# Patient Record
Sex: MALE | Race: WHITE | NOT HISPANIC OR LATINO | Employment: OTHER | URBAN - METROPOLITAN AREA
[De-identification: names, ages, dates, MRNs, and addresses within clinical notes are randomized per-mention and may not be internally consistent; named-entity substitution may affect disease eponyms.]

---

## 2019-06-06 ENCOUNTER — HOSPITAL ENCOUNTER (OUTPATIENT)
Dept: RADIOLOGY | Facility: HOSPITAL | Age: 74
Discharge: HOME | End: 2019-06-06
Attending: ORTHOPAEDIC SURGERY
Payer: MEDICARE

## 2019-06-06 ENCOUNTER — OFFICE VISIT (OUTPATIENT)
Dept: ORTHOPEDICS | Facility: CLINIC | Age: 74
End: 2019-06-06
Payer: MEDICARE

## 2019-06-06 VITALS — BODY MASS INDEX: 33.67 KG/M2 | WEIGHT: 262.38 LBS | HEIGHT: 74 IN

## 2019-06-06 DIAGNOSIS — Z96.641 CHRONIC HIP PAIN AFTER TOTAL REPLACEMENT OF RIGHT HIP JOINT: Primary | ICD-10-CM

## 2019-06-06 DIAGNOSIS — Z96.641 CHRONIC HIP PAIN AFTER TOTAL REPLACEMENT OF RIGHT HIP JOINT: ICD-10-CM

## 2019-06-06 DIAGNOSIS — G89.29 CHRONIC HIP PAIN AFTER TOTAL REPLACEMENT OF RIGHT HIP JOINT: ICD-10-CM

## 2019-06-06 DIAGNOSIS — M25.551 CHRONIC HIP PAIN AFTER TOTAL REPLACEMENT OF RIGHT HIP JOINT: Primary | ICD-10-CM

## 2019-06-06 DIAGNOSIS — G89.29 CHRONIC HIP PAIN AFTER TOTAL REPLACEMENT OF RIGHT HIP JOINT: Primary | ICD-10-CM

## 2019-06-06 DIAGNOSIS — M25.551 CHRONIC HIP PAIN AFTER TOTAL REPLACEMENT OF RIGHT HIP JOINT: ICD-10-CM

## 2019-06-06 PROCEDURE — 73502 XR HIP 2 VIEW RIGHT: ICD-10-PCS | Mod: 26,RT,, | Performed by: RADIOLOGY

## 2019-06-06 PROCEDURE — 99999 PR PBB SHADOW E&M-EST. PATIENT-LVL III: ICD-10-PCS | Mod: PBBFAC,,, | Performed by: ORTHOPAEDIC SURGERY

## 2019-06-06 PROCEDURE — 99214 PR OFFICE/OUTPT VISIT, EST, LEVL IV, 30-39 MIN: ICD-10-PCS | Mod: 25,S$GLB,, | Performed by: ORTHOPAEDIC SURGERY

## 2019-06-06 PROCEDURE — 73502 X-RAY EXAM HIP UNI 2-3 VIEWS: CPT | Mod: 26,RT,, | Performed by: RADIOLOGY

## 2019-06-06 PROCEDURE — 73502 X-RAY EXAM HIP UNI 2-3 VIEWS: CPT | Mod: TC,RT

## 2019-06-06 PROCEDURE — 99999 PR PBB SHADOW E&M-EST. PATIENT-LVL III: CPT | Mod: PBBFAC,,, | Performed by: ORTHOPAEDIC SURGERY

## 2019-06-06 PROCEDURE — 99214 OFFICE O/P EST MOD 30 MIN: CPT | Mod: 25,S$GLB,, | Performed by: ORTHOPAEDIC SURGERY

## 2019-06-06 PROCEDURE — 20610 LARGE JOINT ASPIRATION/INJECTION: R GREATER TROCHANTERIC BURSA: ICD-10-PCS | Mod: RT,S$GLB,, | Performed by: ORTHOPAEDIC SURGERY

## 2019-06-06 PROCEDURE — 20610 DRAIN/INJ JOINT/BURSA W/O US: CPT | Mod: RT,S$GLB,, | Performed by: ORTHOPAEDIC SURGERY

## 2019-06-06 RX ORDER — TRIAMCINOLONE ACETONIDE 40 MG/ML
40 INJECTION, SUSPENSION INTRA-ARTICULAR; INTRAMUSCULAR
Status: DISCONTINUED | OUTPATIENT
Start: 2019-06-06 | End: 2019-06-06 | Stop reason: HOSPADM

## 2019-06-06 RX ADMIN — TRIAMCINOLONE ACETONIDE 40 MG: 40 INJECTION, SUSPENSION INTRA-ARTICULAR; INTRAMUSCULAR at 12:06

## 2019-06-06 NOTE — PROCEDURES
Large Joint Aspiration/Injection: R greater trochanteric bursa  Date/Time: 6/6/2019 12:06 PM  Performed by: John L. Ochsner Jr., MD  Authorized by: John L. [facility] Jr., MD       Location:  Hip  Site:  R greater trochanteric bursa  Needle size:  22 G  Approach:  Lateral  Medications:  40 mg triamcinolone acetonide 40 mg/mL

## 2019-06-06 NOTE — PROGRESS NOTES
HPI:    Patient is here today for a chief complaint of right hip pain.   Patient is status post a right total hip arthroplasty done 03/28/2016.  Patient had no complications with the total hip.  For 3 weeks he has been having a lot of start-up pain.  Sharp pain in the hip that goes away once he loosens up.  No groin pain.  Duration: 1 months  Intensity: severe  Character of pain: sharp  Location: He reports that the pain is positive  For lateral hip.    Past Medical History:   Diagnosis Date    Arthritis     Diabetes mellitus     Hypertension     Thyroid disease           Current Outpatient Medications:     ascorbic acid (VITAMIN C) 1000 MG tablet, Take 1,000 mg by mouth every morning. , Disp: , Rfl:     FEXOFENADINE HCL (ALLEGRA ORAL), Take by mouth every morning., Disp: , Rfl:     folic acid (FOLVITE) 800 MCG Tab, Take 800 mcg by mouth every morning. , Disp: , Rfl:     hydrochlorothiazide (HYDRODIURIL) 25 MG tablet, Take 25 mg by mouth every morning., Disp: , Rfl:     hydrocodone-acetaminophen 5-325mg (NORCO) 5-325 mg per tablet, Take 1 tablet by mouth every 6 to 8 hours as needed for Pain., Disp: 60 tablet, Rfl: 0    levothyroxine (SYNTHROID) 150 MCG tablet, Take 150 mcg by mouth before breakfast. , Disp: , Rfl:     lidocaine-prilocaine (EMLA) cream, Applies to cyst on left foot, Disp: , Rfl:     lisinopril 10 MG tablet, Take 10 mg by mouth every morning. , Disp: , Rfl:     metformin (GLUMETZA) 500 MG (MOD) 24 hr tablet, Take 500 mg by mouth 2 (two) times daily with meals., Disp: , Rfl:     ONE TOUCH ULTRA TEST Strp, , Disp: , Rfl: 3    pravastatin (PRAVACHOL) 10 MG tablet, Take 20 mg by mouth every morning. , Disp: , Rfl:     aspirin 325 MG tablet, Take 1 tablet (325 mg total) by mouth 2 (two) times daily., Disp: 56 tablet, Rfl: 0    pravastatin (PRAVACHOL) 20 MG tablet, 20 mg once daily. , Disp: , Rfl:      Review of patient's allergies indicates:  No Known Allergies     ROS  Constitutional:  "Negative for fever, Negative for weight loss  HENT Negative for congestion  Cardiovascular: Negative chest pain  Respiratory: Negative Shortness of breath  Heme: Negative excessive bleeding  Skin:NegativeItching, Negative breakdown  Musculoskeletal:Positive for back pain, Negative for joint pain, Positive muscle pain, Positive muscle weakness  Neurological: Negative for numbness and paresthesias   Psychiatric/Behavioral: Negative altered mental status, Negative for depression    Physical Exam:   Ht 6' 2" (1.88 m)   Wt 119 kg (262 lb 5.6 oz)   BMI 33.68 kg/m²   General appearance: This is a well-developed, Well nourished male No obvious acute distress.  Psychiatric: normal mood and affect;  pleasant and conversant; judgment and thought content normal  Gait is coordinated. Patient has antalgic gait to the right  Cardiovascular: There are no swelling or varicosities present.   Respiratory: normal respiratory effort   Lymphatic: no adenopathy   Neurologic: alert and oriented to person, place, and time   Deep Tendon Reflexes are normal;  Coordination and Balance: Normal   Musculoskeletal   Neck    ROM shows normal flexion and extension and lateral rotation    Palpation: Non-tender    Stability is normal    Strength is normal    Skin is normal without masses and lesions    Sensation is intact to light touch   Back    ROM showsabnormal flexion, extension    and rotation    Palpation shows no masses    Stability is normal        Core strength is diminished    Skin shows no rashes or cafe au lait spots;     Sensation is intact to light touch   Hip  Right hip has supple range of motion.  No groin pain or tenderness.  There is tenderness over the right greater trochanter..     Radiograph   Right total hip in excellent position no signs of loosening or failure.    Impression: Bursitis or possible lumbar radiculopathy.    Plan:  Today I will inject the bursa and see if that Moya things out for him.  Good news is the total hip " is in excellent position nice and solid and is having no problems with the total hip.

## 2021-01-10 ENCOUNTER — IMMUNIZATION (OUTPATIENT)
Dept: FAMILY MEDICINE | Facility: CLINIC | Age: 76
End: 2021-01-10
Payer: MEDICARE

## 2021-01-10 DIAGNOSIS — Z23 NEED FOR VACCINATION: ICD-10-CM

## 2021-01-10 PROCEDURE — 91300 COVID-19, MRNA, LNP-S, PF, 30 MCG/0.3 ML DOSE VACCINE: CPT | Mod: PBBFAC | Performed by: FAMILY MEDICINE

## 2021-01-19 ENCOUNTER — CLINICAL SUPPORT (OUTPATIENT)
Dept: URGENT CARE | Facility: CLINIC | Age: 76
End: 2021-01-19
Payer: MEDICARE

## 2021-01-19 DIAGNOSIS — Z03.818 ENCOUNTER FOR OBSERVATION FOR SUSPECTED EXPOSURE TO OTHER BIOLOGICAL AGENTS RULED OUT: Primary | ICD-10-CM

## 2021-01-19 LAB
CTP QC/QA: YES
SARS-COV-2 RDRP RESP QL NAA+PROBE: NEGATIVE

## 2021-01-19 PROCEDURE — U0002 COVID-19 LAB TEST NON-CDC: HCPCS | Mod: QW,S$GLB,, | Performed by: PHYSICIAN ASSISTANT

## 2021-01-19 PROCEDURE — U0002: ICD-10-PCS | Mod: QW,S$GLB,, | Performed by: PHYSICIAN ASSISTANT

## 2021-01-31 ENCOUNTER — IMMUNIZATION (OUTPATIENT)
Dept: FAMILY MEDICINE | Facility: CLINIC | Age: 76
End: 2021-01-31
Payer: MEDICARE

## 2021-01-31 DIAGNOSIS — Z23 NEED FOR VACCINATION: Primary | ICD-10-CM

## 2021-01-31 PROCEDURE — 91300 COVID-19, MRNA, LNP-S, PF, 30 MCG/0.3 ML DOSE VACCINE: CPT | Mod: PBBFAC | Performed by: FAMILY MEDICINE

## 2021-01-31 PROCEDURE — 0002A COVID-19, MRNA, LNP-S, PF, 30 MCG/0.3 ML DOSE VACCINE: CPT | Mod: PBBFAC | Performed by: FAMILY MEDICINE

## 2021-03-18 ENCOUNTER — PATIENT MESSAGE (OUTPATIENT)
Dept: RESEARCH | Facility: HOSPITAL | Age: 76
End: 2021-03-18

## 2021-03-26 ENCOUNTER — PATIENT MESSAGE (OUTPATIENT)
Dept: RESEARCH | Facility: HOSPITAL | Age: 76
End: 2021-03-26

## 2021-05-04 ENCOUNTER — HOSPITAL ENCOUNTER (OUTPATIENT)
Dept: RADIOLOGY | Facility: HOSPITAL | Age: 76
Discharge: HOME | End: 2021-05-04
Attending: NURSE PRACTITIONER
Payer: MEDICARE

## 2021-05-04 ENCOUNTER — OFFICE VISIT (OUTPATIENT)
Dept: ORTHOPEDICS | Facility: CLINIC | Age: 76
End: 2021-05-04
Payer: MEDICARE

## 2021-05-04 VITALS — WEIGHT: 262.38 LBS | HEIGHT: 74 IN | BODY MASS INDEX: 33.67 KG/M2

## 2021-05-04 DIAGNOSIS — M25.552 HIP PAIN, ACUTE, LEFT: ICD-10-CM

## 2021-05-04 DIAGNOSIS — M76.892 TENDONITIS OF LEFT HIP: Primary | ICD-10-CM

## 2021-05-04 DIAGNOSIS — M25.512 ACUTE PAIN OF LEFT SHOULDER: ICD-10-CM

## 2021-05-04 DIAGNOSIS — M25.552 HIP PAIN, ACUTE, LEFT: Primary | ICD-10-CM

## 2021-05-04 PROCEDURE — 1125F PR PAIN SEVERITY QUANTIFIED, PAIN PRESENT: ICD-10-PCS | Mod: S$GLB,,, | Performed by: NURSE PRACTITIONER

## 2021-05-04 PROCEDURE — 3008F PR BODY MASS INDEX (BMI) DOCUMENTED: ICD-10-PCS | Mod: CPTII,S$GLB,, | Performed by: NURSE PRACTITIONER

## 2021-05-04 PROCEDURE — 99214 OFFICE O/P EST MOD 30 MIN: CPT | Mod: S$GLB,,, | Performed by: NURSE PRACTITIONER

## 2021-05-04 PROCEDURE — 73502 X-RAY EXAM HIP UNI 2-3 VIEWS: CPT | Mod: 26,LT,, | Performed by: RADIOLOGY

## 2021-05-04 PROCEDURE — 73502 XR HIP 2 VIEW LEFT: ICD-10-PCS | Mod: 26,LT,, | Performed by: RADIOLOGY

## 2021-05-04 PROCEDURE — 1159F MED LIST DOCD IN RCRD: CPT | Mod: S$GLB,,, | Performed by: NURSE PRACTITIONER

## 2021-05-04 PROCEDURE — 99999 PR PBB SHADOW E&M-EST. PATIENT-LVL III: CPT | Mod: PBBFAC,,, | Performed by: NURSE PRACTITIONER

## 2021-05-04 PROCEDURE — 3288F PR FALLS RISK ASSESSMENT DOCUMENTED: ICD-10-PCS | Mod: CPTII,S$GLB,, | Performed by: NURSE PRACTITIONER

## 2021-05-04 PROCEDURE — 73502 X-RAY EXAM HIP UNI 2-3 VIEWS: CPT | Mod: TC,LT

## 2021-05-04 PROCEDURE — 1101F PR PT FALLS ASSESS DOC 0-1 FALLS W/OUT INJ PAST YR: ICD-10-PCS | Mod: CPTII,S$GLB,, | Performed by: NURSE PRACTITIONER

## 2021-05-04 PROCEDURE — 73030 X-RAY EXAM OF SHOULDER: CPT | Mod: TC,LT

## 2021-05-04 PROCEDURE — 3008F BODY MASS INDEX DOCD: CPT | Mod: CPTII,S$GLB,, | Performed by: NURSE PRACTITIONER

## 2021-05-04 PROCEDURE — 1159F PR MEDICATION LIST DOCUMENTED IN MEDICAL RECORD: ICD-10-PCS | Mod: S$GLB,,, | Performed by: NURSE PRACTITIONER

## 2021-05-04 PROCEDURE — 73030 X-RAY EXAM OF SHOULDER: CPT | Mod: 26,LT,, | Performed by: RADIOLOGY

## 2021-05-04 PROCEDURE — 1101F PT FALLS ASSESS-DOCD LE1/YR: CPT | Mod: CPTII,S$GLB,, | Performed by: NURSE PRACTITIONER

## 2021-05-04 PROCEDURE — 3288F FALL RISK ASSESSMENT DOCD: CPT | Mod: CPTII,S$GLB,, | Performed by: NURSE PRACTITIONER

## 2021-05-04 PROCEDURE — 1125F AMNT PAIN NOTED PAIN PRSNT: CPT | Mod: S$GLB,,, | Performed by: NURSE PRACTITIONER

## 2021-05-04 PROCEDURE — 73030 XR SHOULDER COMPLETE 2 OR MORE VIEWS LEFT: ICD-10-PCS | Mod: 26,LT,, | Performed by: RADIOLOGY

## 2021-05-04 PROCEDURE — 99999 PR PBB SHADOW E&M-EST. PATIENT-LVL III: ICD-10-PCS | Mod: PBBFAC,,, | Performed by: NURSE PRACTITIONER

## 2021-05-04 PROCEDURE — 99214 PR OFFICE/OUTPT VISIT, EST, LEVL IV, 30-39 MIN: ICD-10-PCS | Mod: S$GLB,,, | Performed by: NURSE PRACTITIONER

## 2021-05-04 RX ORDER — MELOXICAM 15 MG/1
15 TABLET ORAL DAILY
Qty: 30 TABLET | Refills: 0 | Status: SHIPPED | OUTPATIENT
Start: 2021-05-04 | End: 2021-06-10 | Stop reason: SDUPTHER

## 2021-05-09 ENCOUNTER — HOSPITAL ENCOUNTER (EMERGENCY)
Facility: HOSPITAL | Age: 76
Discharge: HOME | End: 2021-05-09
Attending: EMERGENCY MEDICINE
Payer: MEDICARE

## 2021-05-09 VITALS
SYSTOLIC BLOOD PRESSURE: 146 MMHG | TEMPERATURE: 99 F | OXYGEN SATURATION: 99 % | RESPIRATION RATE: 18 BRPM | BODY MASS INDEX: 32.08 KG/M2 | HEART RATE: 75 BPM | WEIGHT: 250 LBS | DIASTOLIC BLOOD PRESSURE: 72 MMHG | HEIGHT: 74 IN

## 2021-05-09 DIAGNOSIS — M25.452 HIP JOINT EFFUSION, LEFT: ICD-10-CM

## 2021-05-09 DIAGNOSIS — M25.559 HIP PAIN: ICD-10-CM

## 2021-05-09 DIAGNOSIS — T14.8XXA CONTUSION OF BONE: ICD-10-CM

## 2021-05-09 DIAGNOSIS — M25.552 LEFT HIP PAIN: Primary | ICD-10-CM

## 2021-05-09 LAB
ALBUMIN SERPL BCP-MCNC: 3.8 G/DL (ref 3.5–5.2)
ALP SERPL-CCNC: 58 U/L (ref 55–135)
ALT SERPL W/O P-5'-P-CCNC: 17 U/L (ref 10–44)
ANION GAP SERPL CALC-SCNC: 9 MMOL/L (ref 8–16)
AST SERPL-CCNC: 16 U/L (ref 10–40)
BASOPHILS # BLD AUTO: 0.03 K/UL (ref 0–0.2)
BASOPHILS NFR BLD: 0.4 % (ref 0–1.9)
BILIRUB SERPL-MCNC: 0.6 MG/DL (ref 0.1–1)
BUN SERPL-MCNC: 18 MG/DL (ref 8–23)
CALCIUM SERPL-MCNC: 9.5 MG/DL (ref 8.7–10.5)
CHLORIDE SERPL-SCNC: 105 MMOL/L (ref 95–110)
CK SERPL-CCNC: 139 U/L (ref 20–200)
CO2 SERPL-SCNC: 25 MMOL/L (ref 23–29)
CREAT SERPL-MCNC: 0.8 MG/DL (ref 0.5–1.4)
DIFFERENTIAL METHOD: ABNORMAL
EOSINOPHIL # BLD AUTO: 0.1 K/UL (ref 0–0.5)
EOSINOPHIL NFR BLD: 1.5 % (ref 0–8)
ERYTHROCYTE [DISTWIDTH] IN BLOOD BY AUTOMATED COUNT: 13.1 % (ref 11.5–14.5)
EST. GFR  (AFRICAN AMERICAN): >60 ML/MIN/1.73 M^2
EST. GFR  (NON AFRICAN AMERICAN): >60 ML/MIN/1.73 M^2
GLUCOSE SERPL-MCNC: 124 MG/DL (ref 70–110)
HCT VFR BLD AUTO: 39.6 % (ref 40–54)
HGB BLD-MCNC: 14.1 G/DL (ref 14–18)
IMM GRANULOCYTES # BLD AUTO: 0.02 K/UL (ref 0–0.04)
IMM GRANULOCYTES NFR BLD AUTO: 0.3 % (ref 0–0.5)
LYMPHOCYTES # BLD AUTO: 1.1 K/UL (ref 1–4.8)
LYMPHOCYTES NFR BLD: 13.1 % (ref 18–48)
MCH RBC QN AUTO: 34.3 PG (ref 27–31)
MCHC RBC AUTO-ENTMCNC: 35.6 G/DL (ref 32–36)
MCV RBC AUTO: 96 FL (ref 82–98)
MONOCYTES # BLD AUTO: 0.7 K/UL (ref 0.3–1)
MONOCYTES NFR BLD: 9.1 % (ref 4–15)
NEUTROPHILS # BLD AUTO: 6.1 K/UL (ref 1.8–7.7)
NEUTROPHILS NFR BLD: 75.6 % (ref 38–73)
NRBC BLD-RTO: 0 /100 WBC
PLATELET # BLD AUTO: 181 K/UL (ref 150–450)
PMV BLD AUTO: 11.4 FL (ref 9.2–12.9)
POTASSIUM SERPL-SCNC: 4.2 MMOL/L (ref 3.5–5.1)
PROT SERPL-MCNC: 7.2 G/DL (ref 6–8.4)
RBC # BLD AUTO: 4.11 M/UL (ref 4.6–6.2)
SODIUM SERPL-SCNC: 139 MMOL/L (ref 136–145)
WBC # BLD AUTO: 8 K/UL (ref 3.9–12.7)

## 2021-05-09 PROCEDURE — 99285 EMERGENCY DEPT VISIT HI MDM: CPT | Mod: 25

## 2021-05-09 PROCEDURE — 63600175 PHARM REV CODE 636 W HCPCS: Performed by: STUDENT IN AN ORGANIZED HEALTH CARE EDUCATION/TRAINING PROGRAM

## 2021-05-09 PROCEDURE — 85025 COMPLETE CBC W/AUTO DIFF WBC: CPT | Performed by: STUDENT IN AN ORGANIZED HEALTH CARE EDUCATION/TRAINING PROGRAM

## 2021-05-09 PROCEDURE — 25000003 PHARM REV CODE 250: Performed by: STUDENT IN AN ORGANIZED HEALTH CARE EDUCATION/TRAINING PROGRAM

## 2021-05-09 PROCEDURE — 80053 COMPREHEN METABOLIC PANEL: CPT | Performed by: STUDENT IN AN ORGANIZED HEALTH CARE EDUCATION/TRAINING PROGRAM

## 2021-05-09 PROCEDURE — 82550 ASSAY OF CK (CPK): CPT | Performed by: STUDENT IN AN ORGANIZED HEALTH CARE EDUCATION/TRAINING PROGRAM

## 2021-05-09 PROCEDURE — 99285 EMERGENCY DEPT VISIT HI MDM: CPT | Mod: GC,,, | Performed by: EMERGENCY MEDICINE

## 2021-05-09 PROCEDURE — 99285 PR EMERGENCY DEPT VISIT,LEVEL V: ICD-10-PCS | Mod: GC,,, | Performed by: EMERGENCY MEDICINE

## 2021-05-09 PROCEDURE — 63600175 PHARM REV CODE 636 W HCPCS: Performed by: EMERGENCY MEDICINE

## 2021-05-09 PROCEDURE — 96374 THER/PROPH/DIAG INJ IV PUSH: CPT

## 2021-05-09 PROCEDURE — 90715 TDAP VACCINE 7 YRS/> IM: CPT | Performed by: STUDENT IN AN ORGANIZED HEALTH CARE EDUCATION/TRAINING PROGRAM

## 2021-05-09 PROCEDURE — 96375 TX/PRO/DX INJ NEW DRUG ADDON: CPT

## 2021-05-09 PROCEDURE — 90471 IMMUNIZATION ADMIN: CPT | Performed by: STUDENT IN AN ORGANIZED HEALTH CARE EDUCATION/TRAINING PROGRAM

## 2021-05-09 RX ORDER — ONDANSETRON 2 MG/ML
4 INJECTION INTRAMUSCULAR; INTRAVENOUS ONCE
Status: COMPLETED | OUTPATIENT
Start: 2021-05-09 | End: 2021-05-09

## 2021-05-09 RX ORDER — MORPHINE SULFATE 2 MG/ML
6 INJECTION, SOLUTION INTRAMUSCULAR; INTRAVENOUS
Status: COMPLETED | OUTPATIENT
Start: 2021-05-09 | End: 2021-05-09

## 2021-05-09 RX ORDER — ONDANSETRON 2 MG/ML
4 INJECTION INTRAMUSCULAR; INTRAVENOUS ONCE
Status: DISCONTINUED | OUTPATIENT
Start: 2021-05-09 | End: 2021-05-09

## 2021-05-09 RX ORDER — METHOCARBAMOL 500 MG/1
1000 TABLET, FILM COATED ORAL
Status: COMPLETED | OUTPATIENT
Start: 2021-05-09 | End: 2021-05-09

## 2021-05-09 RX ORDER — KETOROLAC TROMETHAMINE 30 MG/ML
15 INJECTION, SOLUTION INTRAMUSCULAR; INTRAVENOUS
Status: COMPLETED | OUTPATIENT
Start: 2021-05-09 | End: 2021-05-09

## 2021-05-09 RX ORDER — METHOCARBAMOL 500 MG/1
1000 TABLET, FILM COATED ORAL 3 TIMES DAILY
Qty: 30 TABLET | Refills: 0 | Status: SHIPPED | OUTPATIENT
Start: 2021-05-09 | End: 2021-05-14

## 2021-05-09 RX ADMIN — METHOCARBAMOL 1000 MG: 500 TABLET ORAL at 10:05

## 2021-05-09 RX ADMIN — MORPHINE SULFATE 6 MG: 2 INJECTION, SOLUTION INTRAMUSCULAR; INTRAVENOUS at 11:05

## 2021-05-09 RX ADMIN — KETOROLAC TROMETHAMINE 15 MG: 30 INJECTION, SOLUTION INTRAMUSCULAR; INTRAVENOUS at 10:05

## 2021-05-09 RX ADMIN — ONDANSETRON 4 MG: 2 INJECTION INTRAMUSCULAR; INTRAVENOUS at 03:05

## 2021-05-09 RX ADMIN — CLOSTRIDIUM TETANI TOXOID ANTIGEN (FORMALDEHYDE INACTIVATED), CORYNEBACTERIUM DIPHTHERIAE TOXOID ANTIGEN (FORMALDEHYDE INACTIVATED), BORDETELLA PERTUSSIS TOXOID ANTIGEN (GLUTARALDEHYDE INACTIVATED), BORDETELLA PERTUSSIS FILAMENTOUS HEMAGGLUTININ ANTIGEN (FORMALDEHYDE INACTIVATED), BORDETELLA PERTUSSIS PERTACTIN ANTIGEN, AND BORDETELLA PERTUSSIS FIMBRIAE 2/3 ANTIGEN 0.5 ML: 5; 2; 2.5; 5; 3; 5 INJECTION, SUSPENSION INTRAMUSCULAR at 06:05

## 2021-06-10 ENCOUNTER — OFFICE VISIT (OUTPATIENT)
Dept: ORTHOPEDICS | Facility: CLINIC | Age: 76
End: 2021-06-10
Payer: MEDICARE

## 2021-06-10 VITALS — WEIGHT: 240.44 LBS | BODY MASS INDEX: 30.86 KG/M2 | HEIGHT: 74 IN

## 2021-06-10 DIAGNOSIS — M76.892 TENDONITIS OF LEFT HIP: ICD-10-CM

## 2021-06-10 DIAGNOSIS — M16.12 PRIMARY OSTEOARTHRITIS OF LEFT HIP: Primary | ICD-10-CM

## 2021-06-10 PROCEDURE — 1125F PR PAIN SEVERITY QUANTIFIED, PAIN PRESENT: ICD-10-PCS | Mod: S$GLB,,, | Performed by: ORTHOPAEDIC SURGERY

## 2021-06-10 PROCEDURE — 3288F PR FALLS RISK ASSESSMENT DOCUMENTED: ICD-10-PCS | Mod: CPTII,S$GLB,, | Performed by: ORTHOPAEDIC SURGERY

## 2021-06-10 PROCEDURE — 1159F PR MEDICATION LIST DOCUMENTED IN MEDICAL RECORD: ICD-10-PCS | Mod: S$GLB,,, | Performed by: ORTHOPAEDIC SURGERY

## 2021-06-10 PROCEDURE — 99999 PR PBB SHADOW E&M-EST. PATIENT-LVL III: ICD-10-PCS | Mod: PBBFAC,,, | Performed by: ORTHOPAEDIC SURGERY

## 2021-06-10 PROCEDURE — 99213 OFFICE O/P EST LOW 20 MIN: CPT | Mod: S$GLB,,, | Performed by: ORTHOPAEDIC SURGERY

## 2021-06-10 PROCEDURE — 99213 PR OFFICE/OUTPT VISIT, EST, LEVL III, 20-29 MIN: ICD-10-PCS | Mod: S$GLB,,, | Performed by: ORTHOPAEDIC SURGERY

## 2021-06-10 PROCEDURE — 3008F BODY MASS INDEX DOCD: CPT | Mod: CPTII,S$GLB,, | Performed by: ORTHOPAEDIC SURGERY

## 2021-06-10 PROCEDURE — 1101F PR PT FALLS ASSESS DOC 0-1 FALLS W/OUT INJ PAST YR: ICD-10-PCS | Mod: CPTII,S$GLB,, | Performed by: ORTHOPAEDIC SURGERY

## 2021-06-10 PROCEDURE — 1159F MED LIST DOCD IN RCRD: CPT | Mod: S$GLB,,, | Performed by: ORTHOPAEDIC SURGERY

## 2021-06-10 PROCEDURE — 1101F PT FALLS ASSESS-DOCD LE1/YR: CPT | Mod: CPTII,S$GLB,, | Performed by: ORTHOPAEDIC SURGERY

## 2021-06-10 PROCEDURE — 3288F FALL RISK ASSESSMENT DOCD: CPT | Mod: CPTII,S$GLB,, | Performed by: ORTHOPAEDIC SURGERY

## 2021-06-10 PROCEDURE — 99999 PR PBB SHADOW E&M-EST. PATIENT-LVL III: CPT | Mod: PBBFAC,,, | Performed by: ORTHOPAEDIC SURGERY

## 2021-06-10 PROCEDURE — 3008F PR BODY MASS INDEX (BMI) DOCUMENTED: ICD-10-PCS | Mod: CPTII,S$GLB,, | Performed by: ORTHOPAEDIC SURGERY

## 2021-06-10 PROCEDURE — 1125F AMNT PAIN NOTED PAIN PRSNT: CPT | Mod: S$GLB,,, | Performed by: ORTHOPAEDIC SURGERY

## 2021-06-10 RX ORDER — MELOXICAM 15 MG/1
15 TABLET ORAL DAILY
Qty: 30 TABLET | Refills: 0 | Status: SHIPPED | OUTPATIENT
Start: 2021-06-10 | End: 2021-07-06

## 2021-06-12 PROBLEM — M16.12 PRIMARY OSTEOARTHRITIS OF LEFT HIP: Status: ACTIVE | Noted: 2021-06-12

## 2021-06-17 ENCOUNTER — OFFICE VISIT (OUTPATIENT)
Dept: SPORTS MEDICINE | Facility: CLINIC | Age: 76
End: 2021-06-17
Payer: MEDICARE

## 2021-06-17 VITALS
WEIGHT: 240 LBS | HEART RATE: 78 BPM | SYSTOLIC BLOOD PRESSURE: 139 MMHG | HEIGHT: 74 IN | BODY MASS INDEX: 30.8 KG/M2 | DIASTOLIC BLOOD PRESSURE: 76 MMHG

## 2021-06-17 DIAGNOSIS — M25.552 CHRONIC LEFT HIP PAIN: ICD-10-CM

## 2021-06-17 DIAGNOSIS — M16.12 PRIMARY OSTEOARTHRITIS OF LEFT HIP: Primary | ICD-10-CM

## 2021-06-17 DIAGNOSIS — G89.29 CHRONIC LEFT HIP PAIN: ICD-10-CM

## 2021-06-17 PROCEDURE — 1101F PR PT FALLS ASSESS DOC 0-1 FALLS W/OUT INJ PAST YR: ICD-10-PCS | Mod: CPTII,S$GLB,, | Performed by: STUDENT IN AN ORGANIZED HEALTH CARE EDUCATION/TRAINING PROGRAM

## 2021-06-17 PROCEDURE — 20611 DRAIN/INJ JOINT/BURSA W/US: CPT | Mod: LT,S$GLB,, | Performed by: STUDENT IN AN ORGANIZED HEALTH CARE EDUCATION/TRAINING PROGRAM

## 2021-06-17 PROCEDURE — 20611 LARGE JOINT ASPIRATION/INJECTION: L HIP JOINT: ICD-10-PCS | Mod: LT,S$GLB,, | Performed by: STUDENT IN AN ORGANIZED HEALTH CARE EDUCATION/TRAINING PROGRAM

## 2021-06-17 PROCEDURE — 1126F AMNT PAIN NOTED NONE PRSNT: CPT | Mod: S$GLB,,, | Performed by: STUDENT IN AN ORGANIZED HEALTH CARE EDUCATION/TRAINING PROGRAM

## 2021-06-17 PROCEDURE — 1126F PR PAIN SEVERITY QUANTIFIED, NO PAIN PRESENT: ICD-10-PCS | Mod: S$GLB,,, | Performed by: STUDENT IN AN ORGANIZED HEALTH CARE EDUCATION/TRAINING PROGRAM

## 2021-06-17 PROCEDURE — 99999 PR PBB SHADOW E&M-EST. PATIENT-LVL IV: ICD-10-PCS | Mod: PBBFAC,,, | Performed by: STUDENT IN AN ORGANIZED HEALTH CARE EDUCATION/TRAINING PROGRAM

## 2021-06-17 PROCEDURE — 3288F PR FALLS RISK ASSESSMENT DOCUMENTED: ICD-10-PCS | Mod: CPTII,S$GLB,, | Performed by: STUDENT IN AN ORGANIZED HEALTH CARE EDUCATION/TRAINING PROGRAM

## 2021-06-17 PROCEDURE — 99499 NO LOS: ICD-10-PCS | Mod: S$GLB,,, | Performed by: STUDENT IN AN ORGANIZED HEALTH CARE EDUCATION/TRAINING PROGRAM

## 2021-06-17 PROCEDURE — 1101F PT FALLS ASSESS-DOCD LE1/YR: CPT | Mod: CPTII,S$GLB,, | Performed by: STUDENT IN AN ORGANIZED HEALTH CARE EDUCATION/TRAINING PROGRAM

## 2021-06-17 PROCEDURE — 99999 PR PBB SHADOW E&M-EST. PATIENT-LVL IV: CPT | Mod: PBBFAC,,, | Performed by: STUDENT IN AN ORGANIZED HEALTH CARE EDUCATION/TRAINING PROGRAM

## 2021-06-17 PROCEDURE — 99499 UNLISTED E&M SERVICE: CPT | Mod: S$GLB,,, | Performed by: STUDENT IN AN ORGANIZED HEALTH CARE EDUCATION/TRAINING PROGRAM

## 2021-06-17 PROCEDURE — 3288F FALL RISK ASSESSMENT DOCD: CPT | Mod: CPTII,S$GLB,, | Performed by: STUDENT IN AN ORGANIZED HEALTH CARE EDUCATION/TRAINING PROGRAM

## 2021-06-17 RX ORDER — TRIAMCINOLONE ACETONIDE 40 MG/ML
40 INJECTION, SUSPENSION INTRA-ARTICULAR; INTRAMUSCULAR
Status: DISCONTINUED | OUTPATIENT
Start: 2021-06-17 | End: 2021-06-17 | Stop reason: HOSPADM

## 2021-06-17 RX ADMIN — TRIAMCINOLONE ACETONIDE 40 MG: 40 INJECTION, SUSPENSION INTRA-ARTICULAR; INTRAMUSCULAR at 09:06

## 2021-09-30 ENCOUNTER — OFFICE VISIT (OUTPATIENT)
Dept: SPORTS MEDICINE | Facility: CLINIC | Age: 76
End: 2021-09-30
Payer: MEDICARE

## 2021-09-30 ENCOUNTER — HOSPITAL ENCOUNTER (OUTPATIENT)
Dept: RADIOLOGY | Facility: HOSPITAL | Age: 76
Discharge: HOME | End: 2021-09-30
Attending: ORTHOPAEDIC SURGERY
Payer: MEDICARE

## 2021-09-30 VITALS
HEART RATE: 62 BPM | DIASTOLIC BLOOD PRESSURE: 68 MMHG | BODY MASS INDEX: 30.8 KG/M2 | WEIGHT: 240 LBS | HEIGHT: 74 IN | SYSTOLIC BLOOD PRESSURE: 140 MMHG

## 2021-09-30 DIAGNOSIS — M19.011 ARTHRITIS OF RIGHT SHOULDER REGION: Primary | ICD-10-CM

## 2021-09-30 DIAGNOSIS — M25.511 RIGHT SHOULDER PAIN, UNSPECIFIED CHRONICITY: ICD-10-CM

## 2021-09-30 DIAGNOSIS — M25.511 CHRONIC RIGHT SHOULDER PAIN: ICD-10-CM

## 2021-09-30 DIAGNOSIS — G89.29 CHRONIC RIGHT SHOULDER PAIN: ICD-10-CM

## 2021-09-30 DIAGNOSIS — M75.21 BICEPS TENDINITIS OF RIGHT UPPER EXTREMITY: ICD-10-CM

## 2021-09-30 PROCEDURE — 73030 X-RAY EXAM OF SHOULDER: CPT | Mod: 26,RT,, | Performed by: RADIOLOGY

## 2021-09-30 PROCEDURE — 4010F ACE/ARB THERAPY RXD/TAKEN: CPT | Mod: CPTII,S$GLB,, | Performed by: ORTHOPAEDIC SURGERY

## 2021-09-30 PROCEDURE — 1101F PT FALLS ASSESS-DOCD LE1/YR: CPT | Mod: CPTII,S$GLB,, | Performed by: ORTHOPAEDIC SURGERY

## 2021-09-30 PROCEDURE — 1101F PR PT FALLS ASSESS DOC 0-1 FALLS W/OUT INJ PAST YR: ICD-10-PCS | Mod: CPTII,S$GLB,, | Performed by: ORTHOPAEDIC SURGERY

## 2021-09-30 PROCEDURE — 3077F PR MOST RECENT SYSTOLIC BLOOD PRESSURE >= 140 MM HG: ICD-10-PCS | Mod: CPTII,S$GLB,, | Performed by: ORTHOPAEDIC SURGERY

## 2021-09-30 PROCEDURE — 3288F PR FALLS RISK ASSESSMENT DOCUMENTED: ICD-10-PCS | Mod: CPTII,S$GLB,, | Performed by: ORTHOPAEDIC SURGERY

## 2021-09-30 PROCEDURE — 1125F AMNT PAIN NOTED PAIN PRSNT: CPT | Mod: CPTII,S$GLB,, | Performed by: ORTHOPAEDIC SURGERY

## 2021-09-30 PROCEDURE — 73030 XR SHOULDER COMPLETE 2 OR MORE VIEWS RIGHT: ICD-10-PCS | Mod: 26,RT,, | Performed by: RADIOLOGY

## 2021-09-30 PROCEDURE — 20610 LARGE JOINT ASPIRATION/INJECTION: R GLENOHUMERAL: ICD-10-PCS | Mod: RT,S$GLB,, | Performed by: ORTHOPAEDIC SURGERY

## 2021-09-30 PROCEDURE — 99999 PR PBB SHADOW E&M-EST. PATIENT-LVL II: ICD-10-PCS | Mod: PBBFAC,,, | Performed by: ORTHOPAEDIC SURGERY

## 2021-09-30 PROCEDURE — 99214 PR OFFICE/OUTPT VISIT, EST, LEVL IV, 30-39 MIN: ICD-10-PCS | Mod: 25,S$GLB,, | Performed by: ORTHOPAEDIC SURGERY

## 2021-09-30 PROCEDURE — 1125F PR PAIN SEVERITY QUANTIFIED, PAIN PRESENT: ICD-10-PCS | Mod: CPTII,S$GLB,, | Performed by: ORTHOPAEDIC SURGERY

## 2021-09-30 PROCEDURE — 3077F SYST BP >= 140 MM HG: CPT | Mod: CPTII,S$GLB,, | Performed by: ORTHOPAEDIC SURGERY

## 2021-09-30 PROCEDURE — 20610 DRAIN/INJ JOINT/BURSA W/O US: CPT | Mod: RT,S$GLB,, | Performed by: ORTHOPAEDIC SURGERY

## 2021-09-30 PROCEDURE — 99999 PR PBB SHADOW E&M-EST. PATIENT-LVL II: CPT | Mod: PBBFAC,,, | Performed by: ORTHOPAEDIC SURGERY

## 2021-09-30 PROCEDURE — 73030 X-RAY EXAM OF SHOULDER: CPT | Mod: TC,RT

## 2021-09-30 PROCEDURE — 4010F PR ACE/ARB THEARPY RXD/TAKEN: ICD-10-PCS | Mod: CPTII,S$GLB,, | Performed by: ORTHOPAEDIC SURGERY

## 2021-09-30 PROCEDURE — 3078F PR MOST RECENT DIASTOLIC BLOOD PRESSURE < 80 MM HG: ICD-10-PCS | Mod: CPTII,S$GLB,, | Performed by: ORTHOPAEDIC SURGERY

## 2021-09-30 PROCEDURE — 3288F FALL RISK ASSESSMENT DOCD: CPT | Mod: CPTII,S$GLB,, | Performed by: ORTHOPAEDIC SURGERY

## 2021-09-30 PROCEDURE — 99214 OFFICE O/P EST MOD 30 MIN: CPT | Mod: 25,S$GLB,, | Performed by: ORTHOPAEDIC SURGERY

## 2021-09-30 PROCEDURE — 3078F DIAST BP <80 MM HG: CPT | Mod: CPTII,S$GLB,, | Performed by: ORTHOPAEDIC SURGERY

## 2021-09-30 RX ADMIN — TRIAMCINOLONE ACETONIDE 40 MG: 40 INJECTION, SUSPENSION INTRA-ARTICULAR; INTRAMUSCULAR at 01:09

## 2021-10-05 ENCOUNTER — IMMUNIZATION (OUTPATIENT)
Dept: INTERNAL MEDICINE | Facility: CLINIC | Age: 76
End: 2021-10-05
Payer: MEDICARE

## 2021-10-05 DIAGNOSIS — Z23 NEED FOR VACCINATION: Primary | ICD-10-CM

## 2021-10-05 PROCEDURE — 0003A COVID-19, MRNA, LNP-S, PF, 30 MCG/0.3 ML DOSE VACCINE: CPT | Mod: CV19,PBBFAC | Performed by: INTERNAL MEDICINE

## 2021-10-05 PROCEDURE — 91300 COVID-19, MRNA, LNP-S, PF, 30 MCG/0.3 ML DOSE VACCINE: CPT | Mod: PBBFAC | Performed by: INTERNAL MEDICINE

## 2021-10-13 RX ORDER — TRIAMCINOLONE ACETONIDE 40 MG/ML
40 INJECTION, SUSPENSION INTRA-ARTICULAR; INTRAMUSCULAR
Status: DISCONTINUED | OUTPATIENT
Start: 2021-09-30 | End: 2021-10-13 | Stop reason: HOSPADM

## 2022-06-21 ENCOUNTER — IMMUNIZATION (OUTPATIENT)
Dept: INTERNAL MEDICINE | Facility: CLINIC | Age: 77
End: 2022-06-21
Payer: MEDICARE

## 2022-06-21 DIAGNOSIS — Z23 NEED FOR VACCINATION: Primary | ICD-10-CM

## 2022-06-21 PROCEDURE — 91305 COVID-19, MRNA, LNP-S, PF, 30 MCG/0.3 ML DOSE VACCINE (PFIZER): CPT | Mod: PBBFAC | Performed by: INTERNAL MEDICINE

## 2023-06-21 ENCOUNTER — HOSPITAL ENCOUNTER (OUTPATIENT)
Dept: PREADMISSION TESTING | Facility: OTHER | Age: 78
Discharge: HOME | End: 2023-06-21
Attending: ORTHOPAEDIC SURGERY
Payer: MEDICARE

## 2023-06-21 ENCOUNTER — ANESTHESIA EVENT (OUTPATIENT)
Dept: SURGERY | Facility: OTHER | Age: 78
End: 2023-06-21
Payer: MEDICARE

## 2023-06-21 VITALS
WEIGHT: 250 LBS | BODY MASS INDEX: 32.08 KG/M2 | OXYGEN SATURATION: 96 % | SYSTOLIC BLOOD PRESSURE: 160 MMHG | DIASTOLIC BLOOD PRESSURE: 90 MMHG | HEART RATE: 75 BPM | TEMPERATURE: 98 F | HEIGHT: 74 IN

## 2023-06-21 DIAGNOSIS — Z01.818 PREOP TESTING: Primary | ICD-10-CM

## 2023-06-21 PROCEDURE — 93010 ELECTROCARDIOGRAM REPORT: CPT | Mod: ,,, | Performed by: INTERNAL MEDICINE

## 2023-06-21 PROCEDURE — 93010 EKG 12-LEAD: ICD-10-PCS | Mod: ,,, | Performed by: INTERNAL MEDICINE

## 2023-06-21 PROCEDURE — 93005 ELECTROCARDIOGRAM TRACING: CPT

## 2023-06-21 RX ORDER — ACETAMINOPHEN 500 MG
1000 TABLET ORAL
Status: CANCELLED | OUTPATIENT
Start: 2023-06-21 | End: 2023-06-21

## 2023-06-21 RX ORDER — SODIUM CHLORIDE, SODIUM LACTATE, POTASSIUM CHLORIDE, CALCIUM CHLORIDE 600; 310; 30; 20 MG/100ML; MG/100ML; MG/100ML; MG/100ML
INJECTION, SOLUTION INTRAVENOUS CONTINUOUS
Status: CANCELLED | OUTPATIENT
Start: 2023-06-21

## 2023-06-21 RX ORDER — IBUPROFEN 200 MG
200 TABLET ORAL EVERY 6 HOURS PRN
Status: ON HOLD | COMMUNITY
End: 2023-07-11 | Stop reason: HOSPADM

## 2023-06-21 RX ORDER — ASPIRIN 81 MG/1
81 TABLET ORAL DAILY
Status: ON HOLD | COMMUNITY
End: 2023-07-11 | Stop reason: HOSPADM

## 2023-06-21 RX ORDER — MAGNESIUM HYDROXIDE 400 MG/5ML
SUSPENSION, ORAL (FINAL DOSE FORM) ORAL DAILY
COMMUNITY

## 2023-06-21 RX ORDER — PRAVASTATIN SODIUM 20 MG/1
20 TABLET ORAL DAILY
COMMUNITY

## 2023-06-21 RX ORDER — LISINOPRIL 40 MG/1
40 TABLET ORAL DAILY
COMMUNITY

## 2023-06-21 RX ORDER — LIDOCAINE HYDROCHLORIDE 10 MG/ML
0.5 INJECTION, SOLUTION EPIDURAL; INFILTRATION; INTRACAUDAL; PERINEURAL ONCE
Status: CANCELLED | OUTPATIENT
Start: 2023-06-21 | End: 2023-06-21

## 2023-06-21 NOTE — DISCHARGE INSTRUCTIONS
Information to Prepare you for your Surgery    PRE-ADMIT TESTING -  [phone]    [facility]          Your surgery has been scheduled at [facility]. We are pleased to have the opportunity to serve you. For Further Information please call [phone].    On the day of surgery please report to the Information Desk on the 1st floor.    CONTACT YOUR PHYSICIAN'S OFFICE THE DAY PRIOR TO YOUR SURGERY TO OBTAIN YOUR ARRIVAL TIME.     The evening before surgery do not eat anything after 9 p.m. ( this includes hard candy, chewing gum and mints).  You may only have GATORADE, POWERADE AND WATER  from 9 p.m. until you leave your home.   DO NOT DRINK ANY LIQUIDS ON THE WAY TO THE HOSPITAL.      Why does your anesthesiologist allow you to drink Gatorade/Powerade before surgery?  Gatorade/Powerade helps to increase your comfort before surgery and to decrease your nausea after surgery. The carbohydrates in Gatorade/Powerade help reduce your body's stress response to surgery.  If you are a diabetic-drink only water prior to surgery.       Patients may have 2 visitors pre and post procedure. Only 2 visitors will be allowed in the Surgical building with the patient. No one under the age of 12 will be allowed into the facility.    SPECIAL MEDICATION INSTRUCTIONS: TAKE medications checked off by the Anesthesiologist on your Medication List.    Angiogram Patients: Take medications as instructed by your physician, including aspirin.     Surgery Patients:    If you take ASPIRIN - Your PHYSICIAN/SURGEON will need to inform you IF/OR when you need to stop taking aspirin prior to your surgery.     The week prior to surgery do not ot take any medications containing IBUPROFEN or NSAIDS ( Advil, Motrin, Goodys, BC, Aleve, Naproxen etc) If you are not sure if you should take a medicine please call your surgeon's office.  Ok to take Tylenol    Do Not Wear any make-up  (especially eye make-up) to surgery. Please remove any false eyelashes or eyelash extensions. If you arrive the day of surgery with makeup/eyelashes on you will be required to remove prior to surgery. (There is a risk of corneal abrasions if eye makeup/eyelash extensions are not removed)      Leave all valuables at home.   Do Not wear any jewelry or watches, including any metal in body piercings. Jewelry must be removed prior to coming to the hospital.  There is a possibility that rings that are unable to be removed may be cut off if they are on the surgical extremity.    Please remove all hair extensions, wigs, clips and any other metal accessories/ ornaments from your hair.  These items may pose a flammable/fire risk in Surgery and must be removed.    Do not shave your surgical area at least 5 days prior to your surgery. The surgical prep will be performed at the hospital according to Infection Control regulations.    Contact Lens must be removed before surgery. Either do not wear the contact lens or bring a case and solution for storage.  Please bring a container for eyeglasses or dentures as required.  Bring any paperwork your physician has provided, such as consent forms,  history and physicals, doctor's orders, etc.   Bring comfortable clothes that are loose fitting to wear upon discharge. Take into consideration the type of surgery being performed.  Maintain your diet as advised per your physician the day prior to surgery.      Adequate rest the night before surgery is advised.   Park in the Parking lot behind the hospital or in the [place] [facility] across the street from the parking lot. Parking is complimentary.  If you will be discharged the same day as your procedure, please arrange for a responsible adult to drive you home or to accompany you if traveling by taxi.   YOU WILL NOT BE PERMITTED TO DRIVE OR TO LEAVE THE HOSPITAL ALONE AFTER SURGERY.   If you are being discharged the same day, it is  strongly recommended that you arrange for someone to remain with you for the first 24 hrs following your surgery.    The Surgeon will speak to your family/visitor after your surgery regarding the outcome of your surgery and post op care.  The Surgeon may speak to you after your surgery, but there is a possibility you may not remember the details.  Please check with your family members regarding the conversation with the Surgeon.    We strongly recommend whoever is bringing you home be present for discharge instructions.  This will ensure a thorough understanding for your post op home care.    ALL CHILDREN MUST ALWAYS BE ACCOMPANIED BY AN ADULT.    Visitors-Refer to current Visitor policy handouts.    Thank you for your cooperation.  The Staff of [facility].            Bathing Instructions with Hibiclens    Shower the evening before and morning of your procedure with Chlorhexidine (Hibiclens)  do not use Chlorhexidine on your face or genitals. Do not get in your eyes.  Wash your face with water and your regular face wash/soap  Use your regular shampoo  Apply Chlorhexidine (Hibiclens) directly on your skin or on a wet washcloth and wash gently. When showering: Move away from the shower stream when applying Chlorhexidine (Hibiclens) to avoid rinsing off too soon.  Rinse thoroughly with warm water  Do not dilute Chlorhexidine (Hibiclens)   Dry off as usual, do not use any deodorant, powder, body lotions, perfume, after shave or cologne.

## 2023-06-21 NOTE — ANESTHESIA PREPROCEDURE EVALUATION
06/21/2023  Maksim Villeda is a 78 y.o., male.      Pre-op Assessment    I have reviewed the Patient Summary Reports.     I have reviewed the Nursing Notes. I have reviewed the NPO Status.   I have reviewed the Medications.     Review of Systems  Anesthesia Hx:  Denies Family Hx of Anesthesia complications.   Denies Personal Hx of Anesthesia complications.   Social:  Former Smoker    Hematology/Oncology:  Hematology Normal   Oncology Normal     Cardiovascular:   Hypertension hyperlipidemia Stress 1/2022:  FINDINGS: Calculated left ventricular ejection fraction is 59% at rest and 60% during stress. No reversible perfusion defects are identified to suggest ischemia. No abnormal wall motion is identified. Left ventricular heart chamber size is within normal limits. TID ratio is .    IMPRESSION:    No reversible perfusion defects to suggest ischemia.   Pulmonary:  Pulmonary Normal    Renal/:  Renal/ Normal     Hepatic/GI:   Hepatitis, C    Musculoskeletal:   Arthritis     Neurological:   H/o dizziness w/u with Brain imaging. negative   Endocrine:   Diabetes Hypothyroidism        Physical Exam  General: Well nourished, Alert, Cooperative and Oriented    Airway:  Mallampati: II   Mouth Opening: Normal  TM Distance: Normal  Tongue: Normal  Neck ROM: Normal ROM    Dental:  Intact, Caps / Implants        Anesthesia Plan  Type of Anesthesia, risks & benefits discussed:    Anesthesia Type: Spinal  Intra-op Monitoring Plan: Standard ASA Monitors  Post Op Pain Control Plan: multimodal analgesia  Informed Consent: Informed consent signed with the Patient and all parties understand the risks and agree with anesthesia plan.  All questions answered.   ASA Score: 3  Anesthesia Plan Notes: Outside Labs normal. EKG    Cardiac clearance low to mod risk based on age and comorbids    Ready For Surgery From Anesthesia  Perspective.     .

## 2023-06-26 NOTE — DISCHARGE INSTRUCTIONS
Hip Replacement Discharge Instructions    IF YOU HAVE ANY CONCERNS AFTER YOUR SURGERY PLEASE CALL YOUR SURGEON   [phone]          Pain:  After surgery you may feel some pain in the operative leg groin area. This is normal. Your hip will likely have been injected with a numbing medicine  prior to completion of surgery for pain control.  You will also get a prescription for pain control before you leave the hospital. Administer your pain med with food to decrease side effects of nausea. Pain meds can cause constipation. Taking a stool softner is recommended.   Be aware of your ingestion of Tylenol if your pain med has this in it,you should not exceed 3000mg of acetaminophen as this can damage your liver.  Elevate your leg when sitting for comfort  Incision Care:  Some drainage from the incision in the first 72 hours is normal. If drainage is excessive, remove bandage,  pat dry, cover with sterile gauze, and secure with tape. Notify M.D. for excessive drainage.  Staples will be removed 14 days after surgery. If your incision is closed with steri strips, do not remove.  Activity:  See attached hip precautions and follow for 6 weeks (instructions found at the end of packet):  Perform exercises 3 times a day.  Use a hard, flat surface, such as a firm mattress, when exercising.  You may shower 2 days after surgery providing the dressing is waterproof.Support/help is mandatory during showering. No bathing or swimming for 6 weeks or until incision is completely healed.  Wear dominik hose for 3 weeks after surgery. You may remove for 1-2 hours during the day only.Send patient home with an extra pair dominik hose. Dominik hose stockings can be washed on gentle cycle, but do not place in dryer.  Safety:  Add cushions to low chairs and car seats for elevation.  When getting in and out of a car, it is important to keep your leg straight and out to the side. Wear a seatbelt at all times.  You will be given a raised toilet seat (3-1  commode).  Use a walker, cane, or crutches as long as M.D. recommends.  Possible Complications: Report to Surgeon  Infection  Unexpected redness  Persistent drainage  iii. Temperature ,can be treated with tylenol. Do not go to the emergency room or urgent care for a temperature, call your surgeon.   Additional swelling  Pain not controlled with current pain medicine  Blood clot : You will be placed on a blood thinner to reduce the risk of blood clots  Unusual pain  Red or discolored skin  Swelling  Unusual warm skin  Dislocation  Severe hip pain especially when moved  The injured leg is shorter than the uninjured leg  The injured leg lies in an abnormal position. In most cases the leg is bent at the hip, turned inward and pulled toward the middle of the body.

## 2023-07-09 NOTE — PLAN OF CARE
Assessment completed with patient via phone - left hip arthroplasty planned tomorrow - will discharge home next day - will allow placement with Dr Dumont's preferred provider - all necessary DME available at home - son/daughter will provide transportation home & assist needed during recovery     [facility] ([facility])  Initial Discharge Assessment       Primary Care Provider: Tomas Rendon Sr, MD    Admission Diagnosis: Unilateral primary osteoarthritis, left hip [M16.12]    Admission Date: 7/10/23  Expected Discharge Date: 7/11/23    Transition of Care Barriers: None    Payor: HU[employer] MANAGED MEDICARE / Plan: HU[employer]NA MEDICARE HMO / Product Type: Capitation /     Extended Emergency Contact Information  Primary Emergency Contact: YonnyantoninoTamiko  Mobile Phone: [phone]  Relation: Daughter    Discharge Plan A: [facility]         [facility]  [address]  [facility] [address]  Phone: [phone] Fax: [phone]    [id number] - [place], [place] - [address]  [address]  [place] [address]  Phone: [phone] Fax: [phone]      Initial Assessment (most recent)       Adult Discharge Assessment - 07/09/23 1403          Discharge Assessment    Assessment Type Discharge Planning Assessment     Confirmed/corrected address, phone number and insurance Yes     Confirmed Demographics Correct on Facesheet     Source of Information patient     Communicated LIZ with patient/caregiver Yes     Reason For Admission left hip arthroplasty     People in Home alone     Do you expect to return to your current living situation? Yes     Do you have help at home or someone to help you manage your care at home? Yes     Prior to hospitilization cognitive status: Alert/Oriented     Current cognitive status: Alert/Oriented     Equipment Currently Used at Home --   available but not currently using: rolling walker, rollator, straight cane, bedside commode, raised toilet, bath  bench, hip kit, glucometer    Do you currently have service(s) that help you manage your care at home? No     Do you take prescription medications? Yes     Do you have prescription coverage? Yes     Do you have any problems affording any of your prescribed medications? No     Is the patient taking medications as prescribed? yes     Who is going to help you get home at discharge? daughter or son     How do you get to doctors appointments? car, drives self     Are you on dialysis? No     Discharge Plan A Home Health     DME Needed Upon Discharge  none     Discharge Plan discussed with: Patient     Transition of Care Barriers None

## 2023-07-10 ENCOUNTER — HOSPITAL ENCOUNTER (OUTPATIENT)
Facility: OTHER | Age: 78
Discharge: HOME HEALTH | End: 2023-07-11
Attending: ORTHOPAEDIC SURGERY | Admitting: ORTHOPAEDIC SURGERY
Payer: MEDICARE

## 2023-07-10 ENCOUNTER — ANESTHESIA (OUTPATIENT)
Dept: SURGERY | Facility: OTHER | Age: 78
End: 2023-07-10
Payer: MEDICARE

## 2023-07-10 DIAGNOSIS — Z01.818 PREOP TESTING: ICD-10-CM

## 2023-07-10 DIAGNOSIS — M16.12 PRIMARY OSTEOARTHRITIS OF LEFT HIP: Primary | ICD-10-CM

## 2023-07-10 LAB
ABO + RH BLD: NORMAL
ANION GAP SERPL CALC-SCNC: 11 MMOL/L (ref 8–16)
BLD GP AB SCN CELLS X3 SERPL QL: NORMAL
BUN SERPL-MCNC: 21 MG/DL (ref 8–23)
CALCIUM SERPL-MCNC: 8.6 MG/DL (ref 8.7–10.5)
CHLORIDE SERPL-SCNC: 103 MMOL/L (ref 95–110)
CO2 SERPL-SCNC: 21 MMOL/L (ref 23–29)
CREAT SERPL-MCNC: 0.8 MG/DL (ref 0.5–1.4)
EST. GFR  (NO RACE VARIABLE): >60 ML/MIN/1.73 M^2
GLUCOSE SERPL-MCNC: 209 MG/DL (ref 70–110)
HCT VFR BLD AUTO: 33.4 % (ref 40–54)
HGB BLD-MCNC: 11.8 G/DL (ref 14–18)
POCT GLUCOSE: 115 MG/DL (ref 70–110)
POCT GLUCOSE: 172 MG/DL (ref 70–110)
POCT GLUCOSE: 280 MG/DL (ref 70–110)
POTASSIUM SERPL-SCNC: 3.9 MMOL/L (ref 3.5–5.1)
SODIUM SERPL-SCNC: 135 MMOL/L (ref 136–145)
SPECIMEN OUTDATE: NORMAL
TROPONIN I SERPL DL<=0.01 NG/ML-MCNC: <0.006 NG/ML (ref 0–0.03)

## 2023-07-10 PROCEDURE — 85014 HEMATOCRIT: CPT | Performed by: INTERNAL MEDICINE

## 2023-07-10 PROCEDURE — 36000710: Performed by: ORTHOPAEDIC SURGERY

## 2023-07-10 PROCEDURE — C1776 JOINT DEVICE (IMPLANTABLE): HCPCS | Performed by: ORTHOPAEDIC SURGERY

## 2023-07-10 PROCEDURE — 37000009 HC ANESTHESIA EA ADD 15 MINS: Performed by: ORTHOPAEDIC SURGERY

## 2023-07-10 PROCEDURE — 25000003 PHARM REV CODE 250: Performed by: NURSE PRACTITIONER

## 2023-07-10 PROCEDURE — 86900 BLOOD TYPING SEROLOGIC ABO: CPT | Performed by: ORTHOPAEDIC SURGERY

## 2023-07-10 PROCEDURE — 37000008 HC ANESTHESIA 1ST 15 MINUTES: Performed by: ORTHOPAEDIC SURGERY

## 2023-07-10 PROCEDURE — 63600175 PHARM REV CODE 636 W HCPCS: Performed by: ANESTHESIOLOGY

## 2023-07-10 PROCEDURE — C1729 CATH, DRAINAGE: HCPCS | Performed by: ORTHOPAEDIC SURGERY

## 2023-07-10 PROCEDURE — 63600175 PHARM REV CODE 636 W HCPCS: Performed by: NURSE PRACTITIONER

## 2023-07-10 PROCEDURE — 97530 THERAPEUTIC ACTIVITIES: CPT

## 2023-07-10 PROCEDURE — 63600175 PHARM REV CODE 636 W HCPCS: Performed by: STUDENT IN AN ORGANIZED HEALTH CARE EDUCATION/TRAINING PROGRAM

## 2023-07-10 PROCEDURE — 36000711: Performed by: ORTHOPAEDIC SURGERY

## 2023-07-10 PROCEDURE — 63600175 PHARM REV CODE 636 W HCPCS: Performed by: ORTHOPAEDIC SURGERY

## 2023-07-10 PROCEDURE — 25000003 PHARM REV CODE 250: Performed by: ANESTHESIOLOGY

## 2023-07-10 PROCEDURE — 25000003 PHARM REV CODE 250: Performed by: ORTHOPAEDIC SURGERY

## 2023-07-10 PROCEDURE — D9220A PRA ANESTHESIA: ICD-10-PCS | Mod: ANES,,, | Performed by: ANESTHESIOLOGY

## 2023-07-10 PROCEDURE — C9290 INJ, BUPIVACAINE LIPOSOME: HCPCS | Performed by: ORTHOPAEDIC SURGERY

## 2023-07-10 PROCEDURE — 85018 HEMOGLOBIN: CPT | Performed by: INTERNAL MEDICINE

## 2023-07-10 PROCEDURE — 82962 GLUCOSE BLOOD TEST: CPT | Performed by: ORTHOPAEDIC SURGERY

## 2023-07-10 PROCEDURE — D9220A PRA ANESTHESIA: ICD-10-PCS | Mod: CRNA,,, | Performed by: NURSE ANESTHETIST, CERTIFIED REGISTERED

## 2023-07-10 PROCEDURE — 99900035 HC TECH TIME PER 15 MIN (STAT)

## 2023-07-10 PROCEDURE — D9220A PRA ANESTHESIA: Mod: ANES,,, | Performed by: ANESTHESIOLOGY

## 2023-07-10 PROCEDURE — C1713 ANCHOR/SCREW BN/BN,TIS/BN: HCPCS | Performed by: ORTHOPAEDIC SURGERY

## 2023-07-10 PROCEDURE — 80048 BASIC METABOLIC PNL TOTAL CA: CPT | Performed by: INTERNAL MEDICINE

## 2023-07-10 PROCEDURE — 94761 N-INVAS EAR/PLS OXIMETRY MLT: CPT

## 2023-07-10 PROCEDURE — 36415 COLL VENOUS BLD VENIPUNCTURE: CPT | Performed by: ORTHOPAEDIC SURGERY

## 2023-07-10 PROCEDURE — 63600175 PHARM REV CODE 636 W HCPCS: Performed by: NURSE ANESTHETIST, CERTIFIED REGISTERED

## 2023-07-10 PROCEDURE — 94799 UNLISTED PULMONARY SVC/PX: CPT

## 2023-07-10 PROCEDURE — 25000003 PHARM REV CODE 250: Performed by: STUDENT IN AN ORGANIZED HEALTH CARE EDUCATION/TRAINING PROGRAM

## 2023-07-10 PROCEDURE — 97162 PT EVAL MOD COMPLEX 30 MIN: CPT

## 2023-07-10 PROCEDURE — 36415 COLL VENOUS BLD VENIPUNCTURE: CPT | Performed by: INTERNAL MEDICINE

## 2023-07-10 PROCEDURE — 71000039 HC RECOVERY, EACH ADD'L HOUR: Performed by: ORTHOPAEDIC SURGERY

## 2023-07-10 PROCEDURE — 25000003 PHARM REV CODE 250: Performed by: NURSE ANESTHETIST, CERTIFIED REGISTERED

## 2023-07-10 PROCEDURE — 27201423 OPTIME MED/SURG SUP & DEVICES STERILE SUPPLY: Performed by: ORTHOPAEDIC SURGERY

## 2023-07-10 PROCEDURE — 84484 ASSAY OF TROPONIN QUANT: CPT | Performed by: INTERNAL MEDICINE

## 2023-07-10 PROCEDURE — D9220A PRA ANESTHESIA: Mod: CRNA,,, | Performed by: NURSE ANESTHETIST, CERTIFIED REGISTERED

## 2023-07-10 PROCEDURE — 71000033 HC RECOVERY, INTIAL HOUR: Performed by: ORTHOPAEDIC SURGERY

## 2023-07-10 DEVICE — SCREW BONE SELF TAP 6.5X40: Type: IMPLANTABLE DEVICE | Site: HIP | Status: FUNCTIONAL

## 2023-07-10 DEVICE — IMPLANTABLE DEVICE: Type: IMPLANTABLE DEVICE | Site: HIP | Status: FUNCTIONAL

## 2023-07-10 DEVICE — SCREW BONE 6.5X30 SELF-TAP: Type: IMPLANTABLE DEVICE | Site: HIP | Status: FUNCTIONAL

## 2023-07-10 DEVICE — STEM FEMORAL 12/14 14X149MM: Type: IMPLANTABLE DEVICE | Site: HIP | Status: FUNCTIONAL

## 2023-07-10 RX ORDER — ACETAMINOPHEN 500 MG
1000 TABLET ORAL EVERY 8 HOURS
Status: DISCONTINUED | OUTPATIENT
Start: 2023-07-10 | End: 2023-07-11 | Stop reason: HOSPADM

## 2023-07-10 RX ORDER — LIDOCAINE HYDROCHLORIDE 20 MG/ML
INJECTION INTRAVENOUS
Status: DISCONTINUED | OUTPATIENT
Start: 2023-07-10 | End: 2023-07-10

## 2023-07-10 RX ORDER — SODIUM CHLORIDE, SODIUM LACTATE, POTASSIUM CHLORIDE, CALCIUM CHLORIDE 600; 310; 30; 20 MG/100ML; MG/100ML; MG/100ML; MG/100ML
INJECTION, SOLUTION INTRAVENOUS CONTINUOUS
Status: DISCONTINUED | OUTPATIENT
Start: 2023-07-10 | End: 2023-07-11

## 2023-07-10 RX ORDER — LEVOTHYROXINE SODIUM 75 UG/1
150 TABLET ORAL
Status: DISCONTINUED | OUTPATIENT
Start: 2023-07-11 | End: 2023-07-11 | Stop reason: HOSPADM

## 2023-07-10 RX ORDER — BISACODYL 10 MG
10 SUPPOSITORY, RECTAL RECTAL DAILY PRN
Status: DISCONTINUED | OUTPATIENT
Start: 2023-07-10 | End: 2023-07-11 | Stop reason: HOSPADM

## 2023-07-10 RX ORDER — PRAVASTATIN SODIUM 20 MG/1
20 TABLET ORAL DAILY
Status: DISCONTINUED | OUTPATIENT
Start: 2023-07-11 | End: 2023-07-11 | Stop reason: HOSPADM

## 2023-07-10 RX ORDER — OXYCODONE HYDROCHLORIDE 5 MG/1
5 TABLET ORAL EVERY 4 HOURS PRN
Status: DISCONTINUED | OUTPATIENT
Start: 2023-07-10 | End: 2023-07-11 | Stop reason: HOSPADM

## 2023-07-10 RX ORDER — ACETAMINOPHEN 500 MG
1000 TABLET ORAL
Status: COMPLETED | OUTPATIENT
Start: 2023-07-10 | End: 2023-07-10

## 2023-07-10 RX ORDER — CETIRIZINE HYDROCHLORIDE 5 MG/1
5 TABLET ORAL DAILY
Status: DISCONTINUED | OUTPATIENT
Start: 2023-07-11 | End: 2023-07-11 | Stop reason: HOSPADM

## 2023-07-10 RX ORDER — EPHEDRINE SULFATE 50 MG/ML
INJECTION, SOLUTION INTRAVENOUS
Status: DISCONTINUED | OUTPATIENT
Start: 2023-07-10 | End: 2023-07-10

## 2023-07-10 RX ORDER — POLYETHYLENE GLYCOL 3350 17 G/17G
17 POWDER, FOR SOLUTION ORAL DAILY
Status: DISCONTINUED | OUTPATIENT
Start: 2023-07-11 | End: 2023-07-11 | Stop reason: HOSPADM

## 2023-07-10 RX ORDER — GLUCAGON 1 MG
1 KIT INJECTION
Status: DISCONTINUED | OUTPATIENT
Start: 2023-07-10 | End: 2023-07-11 | Stop reason: HOSPADM

## 2023-07-10 RX ORDER — ACETAMINOPHEN 500 MG
1000 TABLET ORAL
Status: DISCONTINUED | OUTPATIENT
Start: 2023-07-10 | End: 2023-07-10

## 2023-07-10 RX ORDER — OXYCODONE HYDROCHLORIDE 5 MG/1
10 TABLET ORAL EVERY 4 HOURS PRN
Status: DISCONTINUED | OUTPATIENT
Start: 2023-07-10 | End: 2023-07-11 | Stop reason: HOSPADM

## 2023-07-10 RX ORDER — DIPHENHYDRAMINE HYDROCHLORIDE 50 MG/ML
25 INJECTION INTRAMUSCULAR; INTRAVENOUS EVERY 6 HOURS PRN
Status: DISCONTINUED | OUTPATIENT
Start: 2023-07-10 | End: 2023-07-11 | Stop reason: HOSPADM

## 2023-07-10 RX ORDER — ROPIVACAINE HYDROCHLORIDE 5 MG/ML
INJECTION, SOLUTION EPIDURAL; INFILTRATION; PERINEURAL
Status: COMPLETED | OUTPATIENT
Start: 2023-07-10 | End: 2023-07-10

## 2023-07-10 RX ORDER — ONDANSETRON 2 MG/ML
8 INJECTION INTRAMUSCULAR; INTRAVENOUS EVERY 8 HOURS PRN
Status: DISCONTINUED | OUTPATIENT
Start: 2023-07-10 | End: 2023-07-11 | Stop reason: HOSPADM

## 2023-07-10 RX ORDER — TRANEXAMIC ACID 100 MG/ML
INJECTION, SOLUTION INTRAVENOUS
Status: DISCONTINUED | OUTPATIENT
Start: 2023-07-10 | End: 2023-07-10

## 2023-07-10 RX ORDER — HYDROMORPHONE HYDROCHLORIDE 2 MG/ML
0.4 INJECTION, SOLUTION INTRAMUSCULAR; INTRAVENOUS; SUBCUTANEOUS EVERY 5 MIN PRN
Status: DISCONTINUED | OUTPATIENT
Start: 2023-07-10 | End: 2023-07-10 | Stop reason: HOSPADM

## 2023-07-10 RX ORDER — VITAMIN B COMPLEX
1 CAPSULE ORAL DAILY
COMMUNITY

## 2023-07-10 RX ORDER — SODIUM CHLORIDE 0.9 % (FLUSH) 0.9 %
2 SYRINGE (ML) INJECTION EVERY 6 HOURS PRN
Status: DISCONTINUED | OUTPATIENT
Start: 2023-07-10 | End: 2023-07-11 | Stop reason: HOSPADM

## 2023-07-10 RX ORDER — INSULIN ASPART 100 [IU]/ML
0-5 INJECTION, SOLUTION INTRAVENOUS; SUBCUTANEOUS
Status: DISCONTINUED | OUTPATIENT
Start: 2023-07-10 | End: 2023-07-11 | Stop reason: HOSPADM

## 2023-07-10 RX ORDER — HYDROMORPHONE HYDROCHLORIDE 1 MG/ML
0.5 INJECTION, SOLUTION INTRAMUSCULAR; INTRAVENOUS; SUBCUTANEOUS EVERY 4 HOURS PRN
Status: DISCONTINUED | OUTPATIENT
Start: 2023-07-10 | End: 2023-07-11 | Stop reason: HOSPADM

## 2023-07-10 RX ORDER — PROCHLORPERAZINE EDISYLATE 5 MG/ML
5 INJECTION INTRAMUSCULAR; INTRAVENOUS EVERY 30 MIN PRN
Status: DISCONTINUED | OUTPATIENT
Start: 2023-07-10 | End: 2023-07-10 | Stop reason: HOSPADM

## 2023-07-10 RX ORDER — CHOLECALCIFEROL (VITAMIN D3) 25 MCG
1000 TABLET ORAL DAILY
COMMUNITY

## 2023-07-10 RX ORDER — MUPIROCIN 20 MG/G
OINTMENT TOPICAL 2 TIMES DAILY
Status: DISCONTINUED | OUTPATIENT
Start: 2023-07-10 | End: 2023-07-11 | Stop reason: HOSPADM

## 2023-07-10 RX ORDER — CELECOXIB 200 MG/1
200 CAPSULE ORAL 2 TIMES DAILY
Status: DISCONTINUED | OUTPATIENT
Start: 2023-07-10 | End: 2023-07-11 | Stop reason: HOSPADM

## 2023-07-10 RX ORDER — DOCUSATE SODIUM 100 MG/1
100 CAPSULE, LIQUID FILLED ORAL EVERY 12 HOURS
Status: DISCONTINUED | OUTPATIENT
Start: 2023-07-10 | End: 2023-07-11 | Stop reason: HOSPADM

## 2023-07-10 RX ORDER — METFORMIN HYDROCHLORIDE 500 MG/1
500 TABLET, EXTENDED RELEASE ORAL
Status: DISCONTINUED | OUTPATIENT
Start: 2023-07-10 | End: 2023-07-11 | Stop reason: HOSPADM

## 2023-07-10 RX ORDER — LIDOCAINE HYDROCHLORIDE 10 MG/ML
0.5 INJECTION, SOLUTION EPIDURAL; INFILTRATION; INTRACAUDAL; PERINEURAL ONCE
Status: DISCONTINUED | OUTPATIENT
Start: 2023-07-10 | End: 2023-07-10 | Stop reason: HOSPADM

## 2023-07-10 RX ORDER — DEXTROSE MONOHYDRATE AND SODIUM CHLORIDE 5; .9 G/100ML; G/100ML
INJECTION, SOLUTION INTRAVENOUS CONTINUOUS
Status: DISCONTINUED | OUTPATIENT
Start: 2023-07-10 | End: 2023-07-11

## 2023-07-10 RX ORDER — IBUPROFEN 200 MG
16 TABLET ORAL
Status: DISCONTINUED | OUTPATIENT
Start: 2023-07-10 | End: 2023-07-11 | Stop reason: HOSPADM

## 2023-07-10 RX ORDER — LISINOPRIL 20 MG/1
40 TABLET ORAL DAILY
Status: DISCONTINUED | OUTPATIENT
Start: 2023-07-11 | End: 2023-07-11 | Stop reason: HOSPADM

## 2023-07-10 RX ORDER — PHENYLEPHRINE HYDROCHLORIDE 10 MG/ML
INJECTION INTRAVENOUS
Status: DISCONTINUED | OUTPATIENT
Start: 2023-07-10 | End: 2023-07-10

## 2023-07-10 RX ORDER — DEXAMETHASONE SODIUM PHOSPHATE 4 MG/ML
INJECTION, SOLUTION INTRA-ARTICULAR; INTRALESIONAL; INTRAMUSCULAR; INTRAVENOUS; SOFT TISSUE
Status: DISCONTINUED | OUTPATIENT
Start: 2023-07-10 | End: 2023-07-10

## 2023-07-10 RX ORDER — CEFAZOLIN SODIUM 1 G/3ML
2 INJECTION, POWDER, FOR SOLUTION INTRAMUSCULAR; INTRAVENOUS
Status: COMPLETED | OUTPATIENT
Start: 2023-07-10 | End: 2023-07-10

## 2023-07-10 RX ORDER — FAMOTIDINE 20 MG/1
20 TABLET, FILM COATED ORAL 2 TIMES DAILY
Status: DISCONTINUED | OUTPATIENT
Start: 2023-07-10 | End: 2023-07-11 | Stop reason: HOSPADM

## 2023-07-10 RX ORDER — PROPOFOL 10 MG/ML
VIAL (ML) INTRAVENOUS CONTINUOUS PRN
Status: DISCONTINUED | OUTPATIENT
Start: 2023-07-10 | End: 2023-07-10

## 2023-07-10 RX ORDER — PROPOFOL 10 MG/ML
VIAL (ML) INTRAVENOUS
Status: DISCONTINUED | OUTPATIENT
Start: 2023-07-10 | End: 2023-07-10

## 2023-07-10 RX ORDER — ONDANSETRON 2 MG/ML
INJECTION INTRAMUSCULAR; INTRAVENOUS
Status: DISCONTINUED | OUTPATIENT
Start: 2023-07-10 | End: 2023-07-10

## 2023-07-10 RX ORDER — OXYCODONE HYDROCHLORIDE 5 MG/1
5 TABLET ORAL
Status: DISCONTINUED | OUTPATIENT
Start: 2023-07-10 | End: 2023-07-10 | Stop reason: HOSPADM

## 2023-07-10 RX ORDER — METOCLOPRAMIDE HYDROCHLORIDE 5 MG/ML
5 INJECTION INTRAMUSCULAR; INTRAVENOUS EVERY 6 HOURS PRN
Status: DISCONTINUED | OUTPATIENT
Start: 2023-07-10 | End: 2023-07-11 | Stop reason: HOSPADM

## 2023-07-10 RX ORDER — KETOROLAC TROMETHAMINE 30 MG/ML
INJECTION, SOLUTION INTRAMUSCULAR; INTRAVENOUS
Status: DISCONTINUED | OUTPATIENT
Start: 2023-07-10 | End: 2023-07-10 | Stop reason: HOSPADM

## 2023-07-10 RX ORDER — BUPIVACAINE HCL/EPINEPHRINE 0.25-.0005
VIAL (ML) INJECTION
Status: DISCONTINUED | OUTPATIENT
Start: 2023-07-10 | End: 2023-07-10 | Stop reason: HOSPADM

## 2023-07-10 RX ORDER — NAPROXEN SODIUM 220 MG/1
81 TABLET, FILM COATED ORAL 2 TIMES DAILY
Status: DISCONTINUED | OUTPATIENT
Start: 2023-07-11 | End: 2023-07-11 | Stop reason: HOSPADM

## 2023-07-10 RX ORDER — SODIUM CHLORIDE 0.9 % (FLUSH) 0.9 %
3 SYRINGE (ML) INJECTION
Status: DISCONTINUED | OUTPATIENT
Start: 2023-07-10 | End: 2023-07-10 | Stop reason: HOSPADM

## 2023-07-10 RX ORDER — IBUPROFEN 200 MG
24 TABLET ORAL
Status: DISCONTINUED | OUTPATIENT
Start: 2023-07-10 | End: 2023-07-11 | Stop reason: HOSPADM

## 2023-07-10 RX ORDER — FENTANYL CITRATE 50 UG/ML
INJECTION, SOLUTION INTRAMUSCULAR; INTRAVENOUS
Status: DISCONTINUED | OUTPATIENT
Start: 2023-07-10 | End: 2023-07-10

## 2023-07-10 RX ORDER — MEPERIDINE HYDROCHLORIDE 25 MG/ML
12.5 INJECTION INTRAMUSCULAR; INTRAVENOUS; SUBCUTANEOUS ONCE AS NEEDED
Status: DISCONTINUED | OUTPATIENT
Start: 2023-07-10 | End: 2023-07-10 | Stop reason: HOSPADM

## 2023-07-10 RX ADMIN — METFORMIN HYDROCHLORIDE 500 MG: 500 TABLET, EXTENDED RELEASE ORAL at 04:07

## 2023-07-10 RX ADMIN — DEXAMETHASONE SODIUM PHOSPHATE 8 MG: 4 INJECTION, SOLUTION INTRAMUSCULAR; INTRAVENOUS at 12:07

## 2023-07-10 RX ADMIN — TRANEXAMIC ACID 2000 MG: 100 INJECTION, SOLUTION INTRAVENOUS at 11:07

## 2023-07-10 RX ADMIN — OXYCODONE HYDROCHLORIDE 5 MG: 5 TABLET ORAL at 02:07

## 2023-07-10 RX ADMIN — SODIUM CHLORIDE, SODIUM LACTATE, POTASSIUM CHLORIDE, AND CALCIUM CHLORIDE: 600; 310; 30; 20 INJECTION, SOLUTION INTRAVENOUS at 11:07

## 2023-07-10 RX ADMIN — FENTANYL CITRATE 50 MCG: 50 INJECTION, SOLUTION INTRAMUSCULAR; INTRAVENOUS at 12:07

## 2023-07-10 RX ADMIN — PHENYLEPHRINE HYDROCHLORIDE 200 MCG: 10 INJECTION INTRAVENOUS at 12:07

## 2023-07-10 RX ADMIN — ROPIVACAINE HYDROCHLORIDE 3 ML: 5 INJECTION, SOLUTION EPIDURAL; INFILTRATION; PERINEURAL at 11:07

## 2023-07-10 RX ADMIN — PROPOFOL 30 MG: 10 INJECTION, EMULSION INTRAVENOUS at 11:07

## 2023-07-10 RX ADMIN — EPHEDRINE SULFATE 10 MG: 50 INJECTION INTRAVENOUS at 12:07

## 2023-07-10 RX ADMIN — VANCOMYCIN HYDROCHLORIDE 1750 MG: 500 INJECTION, POWDER, LYOPHILIZED, FOR SOLUTION INTRAVENOUS at 11:07

## 2023-07-10 RX ADMIN — LIDOCAINE HYDROCHLORIDE 5 MG: 10 INJECTION, SOLUTION EPIDURAL; INFILTRATION; INTRACAUDAL; PERINEURAL at 09:07

## 2023-07-10 RX ADMIN — PHENYLEPHRINE HYDROCHLORIDE 100 MCG: 10 INJECTION INTRAVENOUS at 12:07

## 2023-07-10 RX ADMIN — CELECOXIB 200 MG: 200 CAPSULE ORAL at 08:07

## 2023-07-10 RX ADMIN — DEXTROSE AND SODIUM CHLORIDE: 5; 900 INJECTION, SOLUTION INTRAVENOUS at 04:07

## 2023-07-10 RX ADMIN — MUPIROCIN: 20 OINTMENT TOPICAL at 08:07

## 2023-07-10 RX ADMIN — OXYCODONE HYDROCHLORIDE 5 MG: 5 TABLET ORAL at 08:07

## 2023-07-10 RX ADMIN — FAMOTIDINE 20 MG: 20 TABLET, FILM COATED ORAL at 08:07

## 2023-07-10 RX ADMIN — PHENYLEPHRINE HYDROCHLORIDE 100 MCG: 10 INJECTION INTRAVENOUS at 11:07

## 2023-07-10 RX ADMIN — CEFAZOLIN 2 G: 2 INJECTION, POWDER, FOR SOLUTION INTRAMUSCULAR; INTRAVENOUS at 08:07

## 2023-07-10 RX ADMIN — EPHEDRINE SULFATE 5 MG: 50 INJECTION INTRAVENOUS at 12:07

## 2023-07-10 RX ADMIN — PROPOFOL 75 MCG/KG/MIN: 10 INJECTION, EMULSION INTRAVENOUS at 11:07

## 2023-07-10 RX ADMIN — FENTANYL CITRATE 50 MCG: 50 INJECTION, SOLUTION INTRAMUSCULAR; INTRAVENOUS at 11:07

## 2023-07-10 RX ADMIN — GLYCOPYRROLATE 0.2 MG: 0.2 INJECTION, SOLUTION INTRAMUSCULAR; INTRAVITREAL at 12:07

## 2023-07-10 RX ADMIN — CEFAZOLIN 2 G: 330 INJECTION, POWDER, FOR SOLUTION INTRAMUSCULAR; INTRAVENOUS at 11:07

## 2023-07-10 RX ADMIN — ONDANSETRON HYDROCHLORIDE 4 MG: 2 INJECTION INTRAMUSCULAR; INTRAVENOUS at 11:07

## 2023-07-10 RX ADMIN — ACETAMINOPHEN 1000 MG: 500 TABLET ORAL at 09:07

## 2023-07-10 RX ADMIN — INSULIN ASPART 1 UNITS: 100 INJECTION, SOLUTION INTRAVENOUS; SUBCUTANEOUS at 09:07

## 2023-07-10 RX ADMIN — ACETAMINOPHEN 1000 MG: 500 TABLET ORAL at 10:07

## 2023-07-10 RX ADMIN — ONDANSETRON 8 MG: 2 INJECTION INTRAMUSCULAR; INTRAVENOUS at 08:07

## 2023-07-10 RX ADMIN — DOCUSATE SODIUM 100 MG: 100 CAPSULE, LIQUID FILLED ORAL at 08:07

## 2023-07-10 RX ADMIN — LIDOCAINE HYDROCHLORIDE 50 MG: 20 INJECTION, SOLUTION INTRAVENOUS at 11:07

## 2023-07-10 RX ADMIN — PHENYLEPHRINE HYDROCHLORIDE 200 MCG: 10 INJECTION INTRAVENOUS at 01:07

## 2023-07-10 RX ADMIN — VANCOMYCIN HYDROCHLORIDE 1750 MG: 500 INJECTION, POWDER, LYOPHILIZED, FOR SOLUTION INTRAVENOUS at 10:07

## 2023-07-10 RX ADMIN — SODIUM CHLORIDE, SODIUM LACTATE, POTASSIUM CHLORIDE, AND CALCIUM CHLORIDE: 600; 310; 30; 20 INJECTION, SOLUTION INTRAVENOUS at 10:07

## 2023-07-10 RX ADMIN — SODIUM CHLORIDE, SODIUM LACTATE, POTASSIUM CHLORIDE, AND CALCIUM CHLORIDE: 600; 310; 30; 20 INJECTION, SOLUTION INTRAVENOUS at 12:07

## 2023-07-10 RX ADMIN — PHENYLEPHRINE HYDROCHLORIDE 100 MCG: 10 INJECTION INTRAVENOUS at 01:07

## 2023-07-10 NOTE — PLAN OF CARE
[facility]  [address]  [place] [address]  [phone]               [facility] Discharge Orders    Home Alex           Expected Discharge Date: 7/11/23    Diagnoses:  Post-op  left hip(s) replacement    Patient is homebound due to:   Pt requires home health services due to taxing effort to leave the home as a result of immobility from Post-op left hip(s) replacement    Weight Bearing Status:   full weight bearing: left leg     Posterior Hip Precautions for 6 weeks, AVOID:  -Avoid greater than 90 degrees of flexion, internal rotation, and adduction  -Must sleep with hip abduction pillow or regular pillow b/t legs    Physical Therapy   3 times a week for 2 weeks (Call for further orders)  - Ambulate with a rolling walker  - Progress to cane  -Instruct on ROM and strengthening of knee  -Set up for outpt once staples removed and MOD 1 with cane    Wound Care:   If patient is discharged with aqua samanta/silver dressing, leave on for 8 days unless saturated border to border, then follow instructions below:  Cleanse with wound cleanser or normal saline and apply island dressing.  If island dressing not available apply gauze and tegaderm.  Change surgical dressing 3 times a week and PRN  in standing position.  Teach patient to change daily if draining.  Pt may shower if surgical dressing is waterproof.. Removal and replacement of dressing after shower only needed if incision is suspected to have gotten wet during shower.  Otherwise change as previously described depending on dressing/drainage. No soaking in the tub or hot tub  for 6 weeks.    Wear  TEDS Bilateral Knee High Stockings for 3  Weeks. OK to remove stockings 1-2 hours/day max if desired.    PT/SN to remove staples 14 days Post-op and apply skin prep and steri-strips.    Cold therapy/Ice: encouraged at least 20 minutes 2-3 times daily or more if desired.  Incision must be kept waterproof while icing.      Contact:  Please  contact Dr Buddy Ayoub [phone] with concerns.    BLOOD THINNER:    If sent home on Xarelto         -14 days post-op for TKR       -30 days post-op for THR     If sent home home on ASA    81mg   BID x 4 weeks     Once finished with prescribed blood thinner, patients can return to pre-surgical ASA dosage if they took ASA before surgery.     Outpatient Therapy: [facility]    [address] [address]   or  [address] [address]    Call [phone] to schedule appointment  Fax [phone]    If need orders: Call Serene at Ext [phone]        DME:  - rolling Walker  - 3 in 1 commode  - tub bench / shower chair  - Hip Kit  - Per PT/OT recommendation  - Other:Caesar Ayoub

## 2023-07-10 NOTE — NURSING
RR called at 1638. Patient was sitting up at edge of bed with occupational therapy and had a syncope episode.. RR team arrived to room at 1640. Patients vitals WNL... Bell Narayanan RN was notified and said to order an H/H... MARIEL Chiu notified and said to order h/h , bolus 250cc, troponin, and bmp.. orders placed .. patient remains in bed awake alert and oriented x 4... SCDs on.. bolus currently infusing... and daughter at bedside.. will continue to monitor

## 2023-07-10 NOTE — PROGRESS NOTES
Worked with pt on the incentive spirometer. Pt understands the benefits of the breathing device and is working to achieve more volume and sustain a breath hold. Deep breathing and occasional coughing was encouraged.

## 2023-07-10 NOTE — CONSULTS
Consult Note  MED    Consult Requested By: Buddy Dumont MD  Reason for Consult: DM/HTN/Lipids/thyroid    SUBJECTIVE:     History of Present Illness:  Patient is a 78 y.o. male presents with scheduled left hip repair.  Seen pre-op in ambulatory with family at bedside.  Pre-op/Epic reviewed.  No CP/SOB/N/V/D/F/C.      Past Medical History:   Diagnosis Date    Arthritis     Diabetes mellitus     Hyperlipidemia     Hypertension     Thyroid disease      Past Surgical History:   Procedure Laterality Date    APPENDECTOMY      CERVICAL SPINE SURGERY      HIP SURGERY Right 2016    THR    KNEE SURGERY Bilateral     Replacement     Family History   Problem Relation Age of Onset    Heart disease Father      Social History     Tobacco Use    Smoking status: Former     Types: Cigarettes     Quit date: 1996     Years since quittin.0    Smokeless tobacco: Never   Substance Use Topics    Alcohol use: Yes     Alcohol/week: 12.0 standard drinks     Types: 12 Cans of beer per week     Comment: occasional    Drug use: No       Review of patient's allergies indicates:  No Known Allergies     Review of Systems:  Constitutional: No fever or chills  Respiratory: No cough or shortness of breath  Cardiovascular: No chest pain or palpitations  Gastrointestinal: No nausea or vomiting  Neurological: No confusion or weakness    OBJECTIVE:     Vital Signs (Most Recent)       Vital Signs Range (Last 24H):       No intake or output data in the 24 hours ending 07/10/23 0928    Physical Exam:  General appearance: Well developed, well nourished  Eyes:  Conjunctivae/corneas clear. PERRL.  Lungs: Normal respiratory effort,   clear to auscultation bilaterally   Heart: Regular rate and rhythm, S1, S2 normal, no murmur, rub or alexandra.  Abdomen: Soft, non-tender non-distended; bowel sounds normal; no masses,  no organomegaly  Extremities: No cyanosis or clubbing. No edema.    Skin: Skin color, texture, turgor normal. No rashes or  lesions  Neurologic: Normal strength and tone. No focal numbness or weakness       Laboratory:  No results for input(s): WBC, RBC, HGB, HCT, PLT, MCV, MCH, MCHC in the last 24 hours.  BMP: No results for input(s): GLU, NA, K, CL, CO2, BUN, CREATININE, CALCIUM, MG in the last 168 hours.    Invalid input(s):  PHOS  Lab Results   Component Value Date    CALCIUM 9.5 05/09/2021    PHOS 3.5 03/29/2016     BNP  No results for input(s): BNP, BNPTRIAGEBLO in the last 168 hours.No results found for: URICACIDNo results found for: IRON, TIBC, FERRITIN, SATURATEDIRO  Lab Results   Component Value Date    CALCIUM 9.5 05/09/2021    PHOS 3.5 03/29/2016       Diagnostic Results:  No orders to display       ASSESSMENT/PLAN:     Left Hip:  per ortho/therapy teams.    HTN:  meds w/o diuretics and hold parameters.    DM:  resume glumetza in am, ADA diet, SSI.  Change post op IVF's to saline from D5.    Lipids:  statin.    Thyroid:  synthroid home dose.     DVT:  per ortho.      Thanks for consult  See above  Will follow along.

## 2023-07-10 NOTE — PT/OT/SLP EVAL
Physical Therapy Evaluation and Treatment    Patient Name: Maksim Villeda   MRN: [id number]  Recent Surgery: Procedure(s) (LRB):  ARTHROPLASTY, HIP / POSTERIA APPROACH ) (Left) Day of Surgery    Recommendations:     Discharge Recommendations:  (TBD pending OOB assessment)   Discharge Equipment Recommendations: walker, rolling   Barriers to discharge: Increased level of assist and Ongoing medical treatment    Assessment:     Maksim Villeda is a 78 y.o. male admitted with a medical diagnosis of Unilateral primary osteoarthritis, left hip. He presents with the following impairments/functional limitations: weakness, impaired balance, impaired endurance, pain, edema, impaired cardiopulmonary response to activity, decreased ROM, impaired joint extensibility, impaired functional mobility, decreased upper extremity function, decreased lower extremity function, gait instability, orthopedic precautions, impaired coordination.     Patient evaluated by PT and goals established. Patient with minimal pain during therapy session. Review of posterior hip precautions performed with verbal and visual demo. Upon sitting EOB the patient reports feeling nauseous and dizzy. When attempting to return patient to supine and check BP, the patient lost consciousness for about 90 seconds. Nursing immediatly called and assisted to returning patient to supine (total assist with patient still unconscious) and calling a rapid. BP error reported with first attempt to assess BP in supine. BP with second attempt read 124/79. PT will continue to follow and progress as tolerated. Rec for d/c TBD pending OOB assessment. Please see progress note for detailed plan of care and recommendations.     Rehab Prognosis: Good; patient would benefit from acute PT services to address these deficits and reach maximum level of function.    Plan:     During this hospitalization, patient to be seen BID to address the above listed problems via gait training,  therapeutic activities, therapeutic exercises    Plan of Care Expires: 07/20/23    Subjective     Chief Complaint: dizziness and nausea when sitting EOB   Patient Comments/Goals: Patient agrees to participate in PT intervention.   Pain/Comfort:  Pain Rating 1: 2/10  Location - Side 1: Left  Location 1: hip  Pain Addressed 1: Reposition, Distraction, Cessation of Activity    Social History:  Living Environment: Patient lives alone in a single story home with number of outside stair(s): 0  patient reports his brother will be staying with him following DC.   Prior Level of Function: Prior to admission, patient was modified independent using cane occasionally  Equipment Used at Home: cane, straight, bedside commode  DME owned (not currently used): none  Assistance Upon Discharge: family    Objective:     Communicated with nursing prior to session. Patient found supine with peripheral IV, JOSE RAMON drain, rajan catheter upon PT entry to room.    General Precautions: Standard, fall   Orthopedic Precautions: LLE posterior precautions, LLE weight bearing as tolerated   Braces: N/A    Respiratory Status: Room air    Exams:  Cognition:   Patient is oriented to person, place, time and situation.  Pt follows approximately 100% of verbal  commands.    Mood: Pleasant and cooperative.   Safety Awareness: good with in bed assessment  Musculoskeletal:  BMI: 32  Posture:  slouched shoulders, forward head posture   LE ROM/Strength:   R ROM: WNL  L ROM: Not formally assessed due to post op  R Strength:   Knee extension: 4/5  Dorsiflexion: 4/5   L Strength:   Not formally assessed due to post op  Neuromuscular:  Sensation:Not formally assessed due to post op  Tone/Reflexes: No impairments identified with functional mobility. No formal testing performed.   Balance:   Static sitting: Good until loss on consciousness with patient requiring total assist to maintain siting EOB   Static standing: NT  Dynamic standing: NT  Visual-vestibular: No  impairments identified with functional mobility. No formal testing performed.  Integument: Visible skin intact  Cardiopulmonary:  Vital signs:   BP following return to supine: 124/79 mmhg  Edema: none noted      Functional Mobility:  Gait belt applied - No  Bed Mobility  Scooting: total assistance and of 2 persons  Supine to Sit: minimum assistance for LE management  Sit to Supine: total assistance and three persons for LE management and trunk management  Transfers  Unable to assess  Gait  Unable to assess    Therapeutic Activities and Exercises:  Patient educated on role of acute care PT and PT POC, safety while in hospital including calling nurse for mobility, and call light usage.  Reviewed posterior hip surgical precautions  Pt performed supine therapeutic exercises for strengthening and to promote circulation, pressure relief, and functional ROM with verbal, visual and tactile cues for correct performance including:   Bilateral ankle pumps x 10 reps  Bilateral quad sets x 10 reps  Bilateral glute sets x 10 reps      Upon sitting EOB the patient reports feeling nauseous and dizzy. When attempting to return patient to supine and check BP, the patient lost consciousness for about 90 seconds. Nursing immediatly called and assisted to returning patient to supine (total assist with patient still unconscious) and calling a rapid. BP error reported with first attempt to assess BP in supine. BP with second attempt read 124/79.    AM-PAC 6 CLICK MOBILITY  Total Score:9    Patient left supine with all lines intact, call button in reach, and RN, family, MD, and PCT present and continuing to monitor and assess patient following loss of consciousness and calling rapid response.     GOALS:   Multidisciplinary Problems       Physical Therapy Goals          Problem: Physical Therapy    Goal Priority Disciplines Outcome Goal Variances Interventions   Physical Therapy Goal     PT, PT/OT Ongoing, Progressing     Description: Goals  to be met by: 2023     Patient will increase functional independence with mobility by performin. Supine to sit with supervision.   2. Sit to supine with supervision.   3. Sit<>stand transfer with supervision using rolling walker.   4. Gait > 150 feet with SBA using rolling walker.   5. Verbalize posterior hip precautions without verbal cues.                          History:     Past Medical History:   Diagnosis Date    Arthritis     Diabetes mellitus     Hyperlipidemia     Hypertension     Thyroid disease        Past Surgical History:   Procedure Laterality Date    APPENDECTOMY      CERVICAL SPINE SURGERY  1996    HIP SURGERY Right 2016    THR    KNEE SURGERY Bilateral     Replacement       Time Tracking:     PT Received On: 07/10/23  PT Start Time: 1615  PT Stop Time: 1648  PT Total Time (min): 33 min     Billable Minutes: Evaluation 10 and Therapeutic Activity 23    7/10/2023

## 2023-07-10 NOTE — OP NOTE
[facility]  Operative Report    SUMMARY     Surgery Date: 7/10/2023     Surgeon(s) and Role:     * Buddy Dumont MD - Primary    Assistant: CHETAN Henry    Pre-op Diagnosis:  Unilateral primary osteoarthritis, left hip [M16.12]    Post-op Diagnosis:  Post-Op Diagnosis Codes:     * Unilateral primary osteoarthritis, left hip [M16.12]    Procedure(s) (LRB):  ARTHROPLASTY, HIP / POSTERIA APPROACH ) (Left) (Jeri TM hip)    Anesthesia: Spinal    Description of Procedure: Appropriate consent was signed. The patient understood   and accepted all risks and complications. The patient was brought to the Operating Room after undergoing spinal anesthetic. Goode catheter was placed. The patient was then placed in a lateral decubitus position on a peg board with all bony   prominences padded. Perineal drape was applied. The Operative hip and lower extremity were then prepped and draped in a sterile manner and a mini posterior approach was utilized. Dissection was taken down to fascia, which was incised and   gluteus freddy muscle split in line of its fibers.The Charnley retractor was then   placed and the hip internally rotated. The external rotators and capsule were   taken off as one flap and peeled back to protect the sciatic nerve. It was   tagged with #5 Ethibond suture. Leg was then levelled and 2 pins were placed, 1  in the greater trochanter and 1 in the iliac crest and distance measured 11.4  cm. Pin was then removed from greater trochanter and hip dislocated. A femoral  neck osteotomy was performed in 15-20 mm above the lesser trochanter as   templated on preoperative x-rays. Femoral head was removed and proximal femur   was exposed. It was opened up with the cookie cutter, starter reamer and   lateralizing reamer. Trabecular metal reamers were utilized up to 14 mm and   broaching was performed to 14 mm. A thrombin-soaked sponge is placed in the   proximal femur and attention was then directed to  the acetabulum.    Labrum and soft tissue were excised and reaming was begun with a 56 mm reamer   and progressed to 60 mm. A 60 mm press-fit trabecular metal cup with cluster   holes was then impacted obtaining excellent fixation. 2 dome screws were placed  enhancing fixation. A 40 mm neutral highly cross-linked polyethylene liner was  then snapped in the place and checked for loosening. Osteophytes were removed   from around the acetabulum.    Attention was then directed back to the proximal femur where the trial 14 mm   trabecular metal broach was placed and trials were performed. A extended   neck   was required along with a 36 mm head -3.5 mm neck. Leg length measured 11.8   cm. Broach is then removed and a 14 mm extended offset  trabecular metal stem was then impacted in the proximal femur obtaining excellent fixation. A 40 mm   cobalt chrome head -3.5 mm neck was then impacted on the dried trunnion   relocated brought through full range of motion and found to be quite stable.   The hip was then washed out copiously with antibiotic solution through the   Pulsavac. The external rotators and capsule were reattached to trochanteric   attachment through drill holes utilizing #5 Ethibond suture. Exparel cocktail   was injected into the fascia and subcutaneous tissue. Fascia was closed with a   running #2 Quill suture. Subcutaneous closure was obtained with #1 and 2-0   Vicryl. Skin was then closed with staples and a sterile compressive dressing   was applied. The patient was placed in abduction pillow and brought to Recovery  Room in good condition.    Estimated Blood Loss: 400cc         Specimens:   Specimen (24h ago, onward)      None

## 2023-07-10 NOTE — ANESTHESIA POSTPROCEDURE EVALUATION
Anesthesia Post Evaluation    Patient: Maksim Villeda    Procedure(s) Performed: Procedure(s) (LRB):  ARTHROPLASTY, HIP / POSTERIA APPROACH ) (Left)    Final Anesthesia Type: spinal      Patient location during evaluation: PACU  Patient participation: Yes- Able to Participate  Level of consciousness: awake and alert  Post-procedure vital signs: reviewed and stable  Pain management: adequate  Airway patency: patent    PONV status at discharge: No PONV  Anesthetic complications: no      Cardiovascular status: hemodynamically stable  Respiratory status: unassisted  Hydration status: euvolemic  Follow-up not needed.          Vitals Value Taken Time   /80 07/10/23 1432   Temp 36.8 °C (98.2 °F) 07/10/23 1018   Pulse 61 07/10/23 1439   Resp 18 07/10/23 1416   SpO2 97 % 07/10/23 1439   Vitals shown include unvalidated device data.      No case tracking events are documented in the log.      Pain/Doug Score: Pain Rating Prior to Med Admin: 4 (7/10/2023  2:16 PM)  Doug Score: 10 (7/10/2023  2:20 PM)

## 2023-07-10 NOTE — OR NURSING
VSS on RA. Pain is tolerable per pt. Dressing and PIV CDI. Left drain in place. Goode in place draining. Meets PACU discharge criteria. Ready for transfer to [address] [place] [address]. Report given to ROXIE Rodriguez. Family notified.

## 2023-07-10 NOTE — ANESTHESIA PROCEDURE NOTES
Spinal    Diagnosis: left hip pain  Patient location during procedure: holding area  Timeout: 7/10/2023 11:30 AM  End time: 7/10/2023 11:32 AM    Staffing  Authorizing Provider: Chapin Almaguer MD  Performing Provider: Chapin Almaguer MD    Preanesthetic Checklist  Completed: patient identified, IV checked, site marked, risks and benefits discussed, surgical consent, monitors and equipment checked, pre-op evaluation and timeout performed  Spinal Block  Patient position: sitting  Prep: ChloraPrep  Patient monitoring: cardiac monitor and continuous pulse ox  Approach: midline  Location: L3-4  Injection technique: single shot  CSF Fluid: clear free-flowing CSF  Needle  Needle type: pencil-tip   Needle gauge: 25 G  Needle length: 3.5 in  Additional Documentation: incremental injection  Needle localization: anatomical landmarks  Assessment  Sensory level: T10   Dermatomal levels determined by alcohol wipe  Ease of block: easy  Patient's tolerance of the procedure: comfortable throughout block  Medications:    Medications: ropivacaine (NAROPIN) injection 0.5% - Intraspinal   3 mL - 7/10/2023 11:32:00 AM

## 2023-07-10 NOTE — PLAN OF CARE
Problem: Physical Therapy  Goal: Physical Therapy Goal  Description: Goals to be met by: 2023     Patient will increase functional independence with mobility by performin. Supine to sit with supervision.   2. Sit to supine with supervision.   3. Sit<>stand transfer with supervision using rolling walker.   4. Gait > 150 feet with SBA using rolling walker.   5. Verbalize posterior hip precautions without verbal cues.     Outcome: Ongoing, Progressing   Patient evaluated by PT and goals established. Patient with minimal pain during therapy session. Review of posterior hip precautions performed with verbal and visual demo. Upon sitting EOB the patient reports feeling nauseous and dizzy. When attempting to return patient to supine and check BP, the patient lost consciousness for about 90 seconds. Nursing immediatly called and assisted to returning patient to supine (total assist with patient still unconscious) and calling a rapid. BP error reported with first attempt to assess BP in supine. BP with second attempt read 124/79. PT will continue to follow and progress as tolerated. Rec for d/c TBD pending OOB assessment. Please see progress note for detailed plan of care and recommendations.

## 2023-07-10 NOTE — TRANSFER OF CARE
"Anesthesia Transfer of Care Note    Patient: Maksim Villeda    Procedure(s) Performed: Procedure(s) (LRB):  ARTHROPLASTY, HIP / POSTERIA APPROACH ) (Left)    Patient location: PACU    Anesthesia Type: spinal    Transport from OR: Transported from OR on 2-3 L/min O2 by NC with adequate spontaneous ventilation    Post pain: adequate analgesia    Post assessment: no apparent anesthetic complications and tolerated procedure well    Post vital signs: stable    Level of consciousness: awake, oriented and alert    Nausea/Vomiting: no nausea/vomiting    Complications: none    Transfer of care protocol was followed      Last vitals:   Visit Vitals  BP 90/58   Pulse 77   Temp 36.8 °C (98.2 °F) (Oral)   Resp 20   Ht 6' 2" (1.88 m)   Wt 113.4 kg (250 lb)   SpO2 95%   BMI 32.10 kg/m²     "

## 2023-07-10 NOTE — PLAN OF CARE
I certify I provided patient choice and a list to the patient of CMS rated Home Health agencies. Patient signed Patient's Choice Disclosure Form choosing the following    [facility]     Referral forwarded - they will contact patient to schedule home visits     [facility] ([place])  Discharge Final Note    Primary Care Provider: Tomas Rendon Sr, MD    Expected Discharge Date: 7/11/23    Final Discharge Note (most recent)       Final Note - 07/10/23 1146          Final Note    Assessment Type Final Discharge Note     Anticipated Discharge Disposition Home-Health Care [facility]     What phone number can be called within the next 1-3 days to see how you are doing after discharge? [phone]     Hospital Resources/Appts/Education Provided Provided patient/caregiver with written discharge plan information;Post-Acute resouces added to AVS        Post-Acute Status    Post-Acute Authorization Home Health     Home Health Status Set-up Complete/Auth obtained     Patient choice form signed by patient/caregiver List from System Post-Acute Care     Discharge Delays None known at this time                   Contact Info       [facility] [place]   Specialty: Home Health Services, Home Therapy Services, Home Living Aide Services    [address]   [address]  [facility] [address]   Phone: [phone]       Next Steps: Follow up    Instructions: they will contact you to schedule home visits

## 2023-07-11 VITALS
HEART RATE: 73 BPM | OXYGEN SATURATION: 95 % | WEIGHT: 250 LBS | HEIGHT: 74 IN | SYSTOLIC BLOOD PRESSURE: 159 MMHG | RESPIRATION RATE: 18 BRPM | TEMPERATURE: 98 F | DIASTOLIC BLOOD PRESSURE: 74 MMHG | BODY MASS INDEX: 32.08 KG/M2

## 2023-07-11 LAB
ERYTHROCYTE [DISTWIDTH] IN BLOOD BY AUTOMATED COUNT: 12.5 % (ref 11.5–14.5)
HCT VFR BLD AUTO: 30.4 % (ref 40–54)
HGB BLD-MCNC: 10.5 G/DL (ref 14–18)
MCH RBC QN AUTO: 33.1 PG (ref 27–31)
MCHC RBC AUTO-ENTMCNC: 34.5 G/DL (ref 32–36)
MCV RBC AUTO: 96 FL (ref 82–98)
PLATELET # BLD AUTO: 172 K/UL (ref 150–450)
PMV BLD AUTO: 11.2 FL (ref 9.2–12.9)
POCT GLUCOSE: 176 MG/DL (ref 70–110)
POCT GLUCOSE: 218 MG/DL (ref 70–110)
RBC # BLD AUTO: 3.17 M/UL (ref 4.6–6.2)
WBC # BLD AUTO: 10.72 K/UL (ref 3.9–12.7)

## 2023-07-11 PROCEDURE — 25000003 PHARM REV CODE 250: Performed by: ORTHOPAEDIC SURGERY

## 2023-07-11 PROCEDURE — 97116 GAIT TRAINING THERAPY: CPT

## 2023-07-11 PROCEDURE — 85027 COMPLETE CBC AUTOMATED: CPT | Performed by: ORTHOPAEDIC SURGERY

## 2023-07-11 PROCEDURE — 25000003 PHARM REV CODE 250: Performed by: NURSE PRACTITIONER

## 2023-07-11 PROCEDURE — 63600175 PHARM REV CODE 636 W HCPCS: Performed by: ORTHOPAEDIC SURGERY

## 2023-07-11 PROCEDURE — 97535 SELF CARE MNGMENT TRAINING: CPT

## 2023-07-11 PROCEDURE — 94761 N-INVAS EAR/PLS OXIMETRY MLT: CPT

## 2023-07-11 PROCEDURE — 97530 THERAPEUTIC ACTIVITIES: CPT

## 2023-07-11 PROCEDURE — 36415 COLL VENOUS BLD VENIPUNCTURE: CPT | Performed by: ORTHOPAEDIC SURGERY

## 2023-07-11 PROCEDURE — 97165 OT EVAL LOW COMPLEX 30 MIN: CPT

## 2023-07-11 RX ORDER — ONDANSETRON 8 MG/1
8 TABLET, ORALLY DISINTEGRATING ORAL EVERY 8 HOURS PRN
Qty: 20 TABLET | Refills: 1 | Status: SHIPPED | OUTPATIENT
Start: 2023-07-11

## 2023-07-11 RX ORDER — OXYCODONE AND ACETAMINOPHEN 5; 325 MG/1; MG/1
TABLET ORAL
Qty: 60 TABLET | Refills: 0 | Status: SHIPPED | OUTPATIENT
Start: 2023-07-11

## 2023-07-11 RX ORDER — NAPROXEN SODIUM 220 MG/1
81 TABLET, FILM COATED ORAL 2 TIMES DAILY
Qty: 60 TABLET | Refills: 0 | Status: SHIPPED | OUTPATIENT
Start: 2023-07-11

## 2023-07-11 RX ADMIN — FAMOTIDINE 20 MG: 20 TABLET, FILM COATED ORAL at 08:07

## 2023-07-11 RX ADMIN — ONDANSETRON 8 MG: 2 INJECTION INTRAMUSCULAR; INTRAVENOUS at 05:07

## 2023-07-11 RX ADMIN — POLYETHYLENE GLYCOL 3350 17 G: 17 POWDER, FOR SOLUTION ORAL at 09:07

## 2023-07-11 RX ADMIN — CETIRIZINE HYDROCHLORIDE 5 MG: 5 TABLET, FILM COATED ORAL at 08:07

## 2023-07-11 RX ADMIN — PRAVASTATIN SODIUM 20 MG: 20 TABLET ORAL at 08:07

## 2023-07-11 RX ADMIN — CELECOXIB 200 MG: 200 CAPSULE ORAL at 08:07

## 2023-07-11 RX ADMIN — ASPIRIN 81 MG CHEWABLE TABLET 81 MG: 81 TABLET CHEWABLE at 08:07

## 2023-07-11 RX ADMIN — OXYCODONE HYDROCHLORIDE 10 MG: 5 TABLET ORAL at 09:07

## 2023-07-11 RX ADMIN — ACETAMINOPHEN 1000 MG: 500 TABLET ORAL at 05:07

## 2023-07-11 RX ADMIN — CEFAZOLIN 2 G: 2 INJECTION, POWDER, FOR SOLUTION INTRAMUSCULAR; INTRAVENOUS at 04:07

## 2023-07-11 RX ADMIN — DOCUSATE SODIUM 100 MG: 100 CAPSULE, LIQUID FILLED ORAL at 08:07

## 2023-07-11 RX ADMIN — ONDANSETRON 8 MG: 2 INJECTION INTRAMUSCULAR; INTRAVENOUS at 09:07

## 2023-07-11 RX ADMIN — OXYCODONE HYDROCHLORIDE 5 MG: 5 TABLET ORAL at 05:07

## 2023-07-11 RX ADMIN — LEVOTHYROXINE SODIUM 150 MCG: 75 TABLET ORAL at 05:07

## 2023-07-11 RX ADMIN — MUPIROCIN: 20 OINTMENT TOPICAL at 09:07

## 2023-07-11 NOTE — PLAN OF CARE
Patient needs rolling walker - order forwarded to [facility] - Maryse reviewing - once approved [facility] will deliver to bedside

## 2023-07-11 NOTE — PROGRESS NOTES
"Progress Note  Orthopedics    Admit Date: 7/10/2023   Patient ID: Maksim Villeda is a 78 y.o. male.  Postop day 1. Left total hip replacement.  Patient doing well this morning.  Dressing dry, neurovascularly intact.  Hemovac drain pulled.  He did have some orthostatic hypotension yesterday but is feeling better today.  Will do PT today and plan for discharge with home health.  Instructed on hip precautions.  Follow-up in 4 weeks.            Buddy Dumont      Vital Sign (recent):  BP (!) 140/74 (BP Location: Right arm, Patient Position: Lying)   Pulse 68   Temp 98 °F (36.7 °C) (Oral)   Resp 18   Ht 6' 2" (1.88 m)   Wt 113.4 kg (250 lb)   SpO2 96%   BMI 32.10 kg/m²       Laboratory:    CBC:   Recent Labs   Lab 07/11/23  0550   WBC 10.72   RBC 3.17*   HGB 10.5*   HCT 30.4*      MCV 96   MCH 33.1*   MCHC 34.5       CMP:   Recent Labs   Lab 07/10/23  1715   *   CALCIUM 8.6*   *   K 3.9   CO2 21*      BUN 21   CREATININE 0.8           Intake/Output Summary (Last 24 hours) at 7/11/2023 0800  Last data filed at 7/11/2023 0523  Gross per 24 hour   Intake 4174.43 ml   Output 1050 ml   Net 3124.43 ml         Current Medications:   acetaminophen  1,000 mg Oral Q8H    aspirin  81 mg Oral BID    celecoxib  200 mg Oral BID    cetirizine  5 mg Oral Daily    docusate sodium  100 mg Oral Q12H    famotidine  20 mg Oral BID    levothyroxine  150 mcg Oral Before breakfast    lisinopriL  40 mg Oral Daily    metFORMIN  500 mg Oral with dinner    mupirocin   Nasal BID    polyethylene glycol  17 g Oral Daily    pravastatin  20 mg Oral Daily    sodium chloride 0.9%  250 mL Intravenous Once       Continuous Infusions:   dextrose 5 % and 0.9 % NaCl Stopped (07/11/23 7017)    lactated ringers 10 mL/hr at 07/10/23 1040     PRN Meds:.bisacodyL, dextrose 10%, dextrose 10%, diphenhydrAMINE, glucagon (human recombinant), glucose, glucose, HYDROmorphone, insulin aspart U-100, metoclopramide HCl, ondansetron, " oxyCODONE, oxyCODONE, sodium chloride 0.9%

## 2023-07-11 NOTE — PROGRESS NOTES
"MED  Progress Note    Admit Date: 7/10/2023   LOS: 0 days     SUBJECTIVE:     Follow-up For:  Unilateral primary osteoarthritis, left hip    Interval History:     Events noted of yesterday.  Doing better this am.  No CP/SOB/Calf pain.  Discussed with Dr. Dumont/family at bedside.      Review of Systems:  Constitutional: No fever or chills  Respiratory: No cough or shortness of breath  Cardiovascular: No chest pain or palpitations  Gastrointestinal: No nausea or vomiting  Neurological: No confusion or weakness    OBJECTIVE:     Vital Signs Range (Last 24H):  BP (!) 149/67 (BP Location: Left arm, Patient Position: Lying)   Pulse 67   Temp 97.7 °F (36.5 °C) (Oral)   Resp 19   Ht 6' 2" (1.88 m)   Wt 113.4 kg (250 lb)   SpO2 95%   BMI 32.10 kg/m²     Temp:  [97 °F (36.1 °C)-98.2 °F (36.8 °C)]   Pulse:  [62-91]   Resp:  [18-20]   BP: ()/(51-80)   SpO2:  [95 %-100 %]     I & O (Last 24H):  Intake/Output Summary (Last 24 hours) at 7/11/2023 0815  Last data filed at 7/11/2023 0523  Gross per 24 hour   Intake 4174.43 ml   Output 1050 ml   Net 3124.43 ml       Physical Exam:  General appearance: Well developed, well nourished  Eyes:  Conjunctivae/corneas clear. PERRL.  Lungs: Normal respiratory effort,   clear to auscultation bilaterally   Heart: Regular rate and rhythm, S1, S2 normal, no murmur, rub or alexandra.  Abdomen: Soft, non-tender non-distended; bowel sounds normal; no masses,  no organomegaly  Extremities: No cyanosis or clubbing. No edema.    Skin: Skin color, texture, turgor normal. No rashes or lesions  Neurologic: Normal strength and tone. No focal numbness or weakness   Left hip CDI    Laboratory Data:  Recent Labs   Lab 07/11/23  0550   WBC 10.72   RBC 3.17*   HGB 10.5*   HCT 30.4*      MCV 96   MCH 33.1*   MCHC 34.5       BMP:   Recent Labs   Lab 07/10/23  1715   *   *   K 3.9      CO2 21*   BUN 21   CREATININE 0.8   CALCIUM 8.6*     Lab Results   Component Value Date    " CALCIUM 8.6 (L) 07/10/2023    PHOS 3.5 03/29/2016       Lab Results   Component Value Date    CALCIUM 8.6 (L) 07/10/2023    PHOS 3.5 03/29/2016       No results found for: URICACID    BNP  No results for input(s): BNP, BNPTRIAGEBLO in the last 168 hours.    Medications:  Medication list was reviewed and changes noted under Assessment/Plan.    Diagnostic Results:        ASSESSMENT/PLAN:     Left Hip:  per ortho/therapy teams.     HTN/orthostatic hypo:  meds w/o diuretics and hold parameters.  Events of orthostatic hypotension yesterday.  Improved with bolus.  Negative Trop.  Monitor today.  Hold meds for couple days at home until stable.  Discussed with daughter.      DM:  resumed glumetza, ADA diet, SSI.  Change post op IVF's to saline from D5 which was not done yesterday.      Lipids:  statin.     Thyroid:  synthroid home dose.     ABLA:  expected and stable. Defer.      DVT:  per ortho.        Did well with therapy today.   Okay to DC home.

## 2023-07-11 NOTE — PT/OT/SLP PROGRESS
Physical Therapy Treatment    Patient Name:  Maskim Villeda   MRN:  [id number]    Recommendations:     Discharge Recommendations: home health PT, home health OT  Discharge Equipment Recommendations: walker, rolling  Barriers to discharge: None    Assessment:     Maksim Villeda is a 78 y.o. male admitted with a medical diagnosis of Unilateral primary osteoarthritis, left hip; he is s/p L BALDO using posterior approach.  He presents with the following impairments/functional limitations: weakness, impaired endurance, impaired self care skills, impaired functional mobility, gait instability, impaired balance, decreased lower extremity function, decreased ROM, decreased safety awareness, pain, orthopedic precautions, impaired joint extensibility, impaired cardiopulmonary response to activity.    Pt tolerated treatment well with no adverse reactions. Pt performed transfers and ambulation x 150 ft with rolling walker and contact guard assistance. Demonstrated improved tolerance to out of bed mobility and improved cardiopulmonary response to mobility. Pt continues to be limited by pain. Educated patient on BALDO precautions, safe use of rolling walker, home exercise program, importance of mobility, and PT plan of care. Pt verbalized understanding. Pt will benefit from skilled PT services to address impairments and functional limitations.     Rehab Prognosis: Good; patient would benefit from acute skilled PT services to address these deficits and reach maximum level of function.    Recent Surgery: Procedure(s) (LRB):  ARTHROPLASTY, HIP / POSTERIA APPROACH ) (Left) 1 Day Post-Op    Plan:     During this hospitalization, patient to be seen BID to address the identified rehab impairments via gait training, therapeutic activities, therapeutic exercises, neuromuscular re-education and progress toward the following goals:    Plan of Care Expires:  07/20/23    Subjective     Chief Complaint: hip pain   Patient/Family  Comments/goals: Pt initially dizzy / weak upon standing, however, BP in normal range and symptoms passed with time.   Pain/Comfort:  Pain Rating 1: 8/10  Location - Side 1: Left  Location - Orientation 1: generalized  Location 1: hip  Pain Addressed 1: Reposition, Distraction, Cessation of Activity, Pre-medicate for activity, Nurse notified      Objective:     Communicated with ROXIE Dangelo prior to session.  Patient found sitting edge of bed with peripheral IV, telemetry upon PT entry to room.     General Precautions: Standard, fall  Orthopedic Precautions: LLE posterior precautions, LLE weight bearing as tolerated  Braces: N/A  Respiratory Status: Room air     Functional Mobility: Gait belt and non-slip socks donned prior to mobility.  Transfers:     Sit to Stand: contact guard assistance with rolling walker and bed elevated   Required verbal cues for hand placement  Minimum assistance from lower recliner chair   Gait: 150 ft with rolling walker and contact guard assistance.  Pt required verbal cues for rolling walker management and step-through gait pattern.  Noted gait deviations of step to gait pattern, decreased heel strike/toe off of surgical extremity, and increased double limb support time.   Stairs: Ascended/descended 1 step with contact guard assistance and rolling walker.  Step-to pattern  Required verbal cues for technique.      AM-PAC 6 CLICK MOBILITY  Turning over in bed (including adjusting bedclothes, sheets and blankets)?: 3  Sitting down on and standing up from a chair with arms (e.g., wheelchair, bedside commode, etc.): 3  Moving from lying on back to sitting on the side of the bed?: 2  Moving to and from a bed to a chair (including a wheelchair)?: 3  Need to walk in hospital room?: 3  Climbing 3-5 steps with a railing?: 2  Basic Mobility Total Score: 16       Treatment & Education:  Bed mobility, transfer, gait and stair training as described above.  Pt required lengthy seated rest break between  ambulation trial and stair navigation trial.     PT educated patient, daughter and son re:   PT plan of care/role of PT  Safety with OOB mobility  Use of RW  Positioning of LE and use of ice  BALDO posterior precautions  Gait deviations  Stair navigation  Pt and family verbalized understanding     Patient left up in chair with all lines intact, call button in reach, and family and RN present..    GOALS:   Multidisciplinary Problems       Physical Therapy Goals          Problem: Physical Therapy    Goal Priority Disciplines Outcome Goal Variances Interventions   Physical Therapy Goal     PT, PT/OT Ongoing, Progressing     Description: Goals to be met by: 2023     Patient will increase functional independence with mobility by performin. Supine to sit with supervision.   2. Sit to supine with supervision.   3. Sit<>stand transfer with supervision using rolling walker.   4. Gait > 150 feet with SBA using rolling walker.   5. Verbalize posterior hip precautions without verbal cues.                          Time Tracking:     PT Received On: 23  PT Start Time: 0851     PT Stop Time: 0920  PT Total Time (min): 29 min     Billable Minutes: Gait Training 15 and Therapeutic Activity 14    Treatment Type: Treatment  PT/PTA: PT     Number of PTA visits since last PT visit: 0     2023

## 2023-07-11 NOTE — PLAN OF CARE
Problem: Occupational Therapy  Goal: Occupational Therapy Goal  Description: OT goals to be met by 7/18/23.  1. Pt will complete toilet t/f (BSC over toilet for increased seat height) with RW with SPV.  2. Pt will complete toileting with SPV.  3. Pt will complete UB dressing with Setup.  4. Pt will complete LB dressing with Setup and hip kit.  5. Pt will complete g/h standing at sink with RW with SPV.  6. Pt will adhere to L posterior BALDO precautions 100% of the time during ADL with SPV.  Outcome: Ongoing, Progressing     OT orders received and evaluation complete. POC/goals established. Pt completed chair T/F with Min A, toilet t/f with SBA (BSC over commode), and LB dressing with Setup and hip kit. Mild SOB during ambulation, requiring brief seated rest break. Pt and family educated with demonstration re: L posterior BALDO precautions. Pt verbalized and demonstrated understanding. Recommend discharge home with assist from family and home health OT/PT. Pt has BSC and TTB at home but will need RW before discharge.

## 2023-07-11 NOTE — NURSING
Discharge instructions provided. Patient and family verbalized understanding.. IV removed .prescriptions delivered to bedside . Patient is waiting for walker to be delivered to bedside.

## 2023-07-11 NOTE — PT/OT/SLP EVAL
"Occupational Therapy   Evaluation    Name: Maksim Villeda  MRN: [id number]  Admitting Diagnosis: Unilateral primary osteoarthritis, left hip  Recent Surgery: Procedure(s) (LRB):  ARTHROPLASTY, HIP / POSTERIA APPROACH ) (Left) 1 Day Post-Op    Recommendations:     Discharge Recommendations: home health PT, home health OT  Discharge Equipment Recommendations:  walker, rolling  Barriers to discharge:  None    Assessment:     Maksim Villeda is a 78 y.o. male with a medical diagnosis of Unilateral primary osteoarthritis, left hip.  He presents with the following performance deficits affecting function: weakness, impaired endurance, impaired self care skills, impaired functional mobility, gait instability, impaired balance, decreased lower extremity function, decreased ROM, impaired skin, impaired cardiopulmonary response to activity, impaired joint extensibility, orthopedic precautions.      OT orders received and evaluation complete. POC/goals established. Pt completed chair T/F with Min A, toilet t/f with SBA (BSC over commode), and LB dressing with Setup and hip kit. Mild SOB during ambulation, requiring brief seated rest break. Pt and family educated with demonstration re: L posterior BALDO precautions. Pt verbalized and demonstrated understanding. Recommend discharge home with assist from family and home health OT/PT. Pt has BSC and TTB at home but will need RW before discharge.      Rehab Prognosis: Good; patient would benefit from acute skilled OT services to address these deficits and reach maximum level of function.       Plan:     Patient to be seen 5 x/week to address the above listed problems via self-care/home management, therapeutic activities, therapeutic exercises  Plan of Care Expires: 08/10/23  Plan of Care Reviewed with: patient, daughter, son    Subjective     Chief Complaint: fatigue  Patient/Family Comments/goals: "That went better than I thought"    Occupational Profile:  Living " Environment: Pt lives alone in a SSH with 2 small steps to enter, no HR. Pt has a tub/shower with a TTB. Pt has a bedside commode placed over this toilet for increased seat height and grab bars. Pt uses a lift chair. Pt has a hip kit from previous R BALDO.   Previous level of function: Mod I with straight cane occasionally  Roles and Routines: retired, father  Equipment Used at Home: cane, straight, bedside commode, bath bench, hip kit  Assistance upon Discharge: family    Pain/Comfort:  Pain Rating 1: 6/10  Location - Side 1: Left  Location 1: hip  Pain Addressed 1: Cessation of Activity (RN just adminstered pain meds)  Pain Rating Post-Intervention 1: 6/10    Vitals  Sitting in chair at beginning of evaluation: /56, HR 73  Sitting in chair following ambulation: /74, HR 84  ROXIE Dangelo notified    Patients cultural, spiritual, Jewish conflicts given the current situation: no    Objective:     Communicated with: ROXIE Dangelo prior to session.  Patient found up in chair with peripheral IV upon OT entry to room. Family in room.     General Precautions: Standard, fall  Orthopedic Precautions: LLE weight bearing as tolerated, LLE posterior precautions  Braces: N/A  Respiratory Status: Room air    Occupational Performance:    Bed Mobility:    NT; pt seated in chair at beginning and end of evaluation    Functional Mobility/Transfers:  Min A STS from chair with RW, VC for safe hand placement, technique  SBA toilet t/f with RW (BSC over commode for increased seat height to adhere to posterior BALDO precautions)   Functional Mobility: CGA progressing to SBA ambulate ~25 ft chair<>toilet with RW; mild SOB during activity, requiring seated rest break for 1 min, SOB resolved    Activities of Daily Living:  Upper Body Dressing: NT 2/2 IV (unable to don pullover shirt for discharge home)    Lower Body Dressing: supervision /setup to don shorts seated in chair with hip kit to thread B LE, standing with RW to pull over  hips  Toileting: stand by assistance standing with RW at toilet to urinate; 100 cc orange urine, RN Loreto notified    Cognitive/Visual Perceptual:  Cognitive/Psychosocial Skills:     -       Oriented to: Person, Place, Time, and Situation   -       Follows Commands/attention:Follows multistep  commands  -       Communication: clear/fluent  -       Memory: No Deficits noted  -       Safety awareness/insight to disability: intact   -       Mood/Affect/Coping skills/emotional control: Cooperative and Pleasant    Physical Exam:  Balance:    -       Good static/dynamic sitting (minimal challenges to balance); Good static/dynamic standing with RW  Upper Extremity Range of Motion:     -       Right Upper Extremity: WFL  -       Left Upper Extremity: WFL  Upper Extremity Strength:    -       Right Upper Extremity: WFL  -       Left Upper Extremity: WFL   Strength:    -       Right Upper Extremity: WFL  -       Left Upper Extremity: WFL  UE assessment as observed during functional tasks    [facility] 6 Click ADL:  [facility] Total Score: 19    Treatment & Education:  -Education on role of OT, POC, discharge recs, ortho precautions, safe use of DME, safety at home  -SPV/setup for LB dressing with hip kit  -Min A sit<>stand from chair with RW, VC for safe hand placement, technique  -SBA sit<>stand from BSC over toilet  -CGA progressing to SBA ~25 ft x 2 chair<>toilet with RW    Patient left up in chair with all lines intact, call button in reach, Loreto notified, and son/daughter present    GOALS:   Multidisciplinary Problems       Occupational Therapy Goals          Problem: Occupational Therapy    Goal Priority Disciplines Outcome Interventions   Occupational Therapy Goal     OT, PT/OT Ongoing, Progressing    Description: OT goals to be met by 7/18/23.  1. Pt will complete toilet t/f (BSC over toilet for increased seat height) with RW with SPV.  2. Pt will complete toileting with SPV.  3. Pt will complete UB dressing with  Setup.  4. Pt will complete LB dressing with Setup and hip kit.  5. Pt will complete g/h standing at sink with RW with SPV.  6. Pt will adhere to L posterior BALDO precautions 100% of the time during ADL with SPV.                       History:     Past Medical History:   Diagnosis Date    Arthritis     Diabetes mellitus     Hyperlipidemia     Hypertension     Thyroid disease          Past Surgical History:   Procedure Laterality Date    APPENDECTOMY      CERVICAL SPINE SURGERY  1996    HIP SURGERY Right 03/28/2016    THR    KNEE SURGERY Bilateral     Replacement       Time Tracking:     OT Date of Treatment: 07/11/23  OT Start Time: 1003  OT Stop Time: 1031  OT Total Time (min): 28 min    Billable Minutes:Evaluation 15  Self Care/Home Management 13    7/11/2023

## 2023-07-17 NOTE — DISCHARGE SUMMARY
[facility]  Short Stay  Discharge Summary  TOTAL HIP REPLACEMENT    Admit Date: 7/10/2023    Discharge Date and Time: 7/11/2023 12:42 PM      Discharge Attending Physician: Buddy Dumont MD     Hospital Course:  Maksim Villeda,is a 78 y.o. male with severe osteoarthritis,   L hip, unrelieved with the conservative measures. The patient was admitted on 7/10/2023 underwent L total hip replacement.  Postoperatively, the patient did well and was weightbearing as tolerated with a walker. Maksim Villeda was instructed on hip precautions.  The patient was discharged on 7/11/2023 with home health physical therapy and nursing. The patient will be on Percocet for pain and aspirin 325 mg p.o. b.i.d. with meals x4 weeks. I will see them back in the office in 4 weeks for followup.      Final Diagnoses:    Principal Problem: Unilateral primary osteoarthritis, left hip   Secondary Diagnoses:   Active Hospital Problems    Diagnosis  POA    *Unilateral primary osteoarthritis, left hip [M16.12]  Yes      Resolved Hospital Problems   No resolved problems to display.       Discharged Condition: good    Disposition: Home-Health Care [facility]    Follow up/Patient Instructions:    Medications:  Reconciled Home Medications:      Medication List        START taking these medications      aspirin 81 MG Chew  Take 1 tablet (81 mg total) by mouth 2 (two) times daily.  Replaces: aspirin 81 MG EC tablet     ondansetron 8 MG Tbdl  Commonly known as: ZOFRAN-ODT  dissolve 1 tablet (8 mg total) by mouth every 8 (eight) hours as needed (Nausea).     oxyCODONE-acetaminophen 5-325 mg per tablet  Commonly known as: PERCOCET  take 1 tablet every 4 hours as needed or 2 tablets every 6 hours as needed            CONTINUE taking these medications      ALLEGRA ORAL  Take by mouth every morning.     ascorbic acid (vitamin C) 1000 MG tablet  Commonly known as: VITAMIN C  Take 1,000 mg by mouth every morning.     b complex vitamins  "capsule  Take 1 capsule by mouth once daily.     hydroCHLOROthiazide 25 MG tablet  Commonly known as: HYDRODIURIL  Take 25 mg by mouth every morning.     levothyroxine 150 MCG tablet  Commonly known as: SYNTHROID  Take 150 mcg by mouth before breakfast.     lisinopriL 40 MG tablet  Commonly known as: PRINIVIL,ZESTRIL  Take 40 mg by mouth once daily. After lunch     metFORMIN 500 MG (MOD) 24hr tablet  Commonly known as: GLUMETZA  Take 500 mg by mouth 2 (two) times daily with meals.     SightlyTOUCH ULTRA TEST Strp  Generic drug: blood sugar diagnostic     potassium gluconate 595 mg (99 mg) Tab  Take by mouth Daily.     pravastatin 20 MG tablet  Commonly known as: PRAVACHOL  Take 20 mg by mouth once daily.     vitamin D 1000 units Tab  Commonly known as: VITAMIN D3  Take 1,000 Units by mouth once daily.            STOP taking these medications      aspirin 81 MG EC tablet  Commonly known as: ECOTRIN  Replaced by: aspirin 81 MG Chew     ibuprofen 200 MG tablet  Commonly known as: ADVIL,MOTRIN            Discharge Procedure Orders   WALKER FOR HOME USE     Order Specific Question Answer Comments   Type of Walker: Adult (5'4"-6'6")    With wheels? Yes    Height: 6' 2" (1.88 m)    Weight: 113.4 kg (250 lb)    Does patient have medical equipment at home? cane, straight    Does patient have medical equipment at home? bedside commode    Length of need (1-99 months): 99      HIP KIT FOR HOME USE     Order Specific Question Answer Comments   Height: 6' 2" (1.88 m)    Weight: 113.4 kg (250 lb)    Does patient have medical equipment at home? cane, straight    Does patient have medical equipment at home? bedside commode    Length of need (1-99 months): 99    Type: [place] Hip Kit      3 IN 1 COMMODE FOR HOME USE     Order Specific Question Answer Comments   Type: Standard    Height: 6' 2" (1.88 m)    Weight: 113.4 kg (250 lb)    Does patient have medical equipment at home? cane, straight    Does patient have medical equipment at " "home? bedside commode    Length of need (1-99 months): 99      TRANSFER TUB BENCH FOR HOME USE     Order Specific Question Answer Comments   Type of Transfer Tub Bench: Unpadded    Height: 6' 2" (1.88 m)    Weight: 113.4 kg (250 lb)    Does patient have medical equipment at home? cane, straight    Does patient have medical equipment at home? bedside commode    Length of need (1-99 months): 99    Patient notified - Not covered by insurance considered a convenience item Yes    Discussed financial responsibility with responsible party Yes      BATH/SHOWER CHAIR FOR HOME USE     Order Specific Question Answer Comments   Height: 6' 2" (1.88 m)    Weight: 113.4 kg (250 lb)    Does patient have medical equipment at home? cane, straight    Does patient have medical equipment at home? bedside commode    Length of need (1-99 months): 99    Type: With back      Change dressing (specify)   Order Comments: Home Health to perform dressing changes      Follow-up Information       [facility] Follow up.    Specialties: Home Health Services, Home Therapy Services, Home Living Aide Services  Why: they will contact you to schedule home visits  Contact information:  Derek[address] GRACIE Ragland  [address] [id number] [phone]             Buddy Dumont MD Follow up in 4 week(s).    Specialty: Orthopedic Surgery  Contact information:  [address] [facility]  [facility] [id number] [phone]               [facility] Follow up.    Specialty: DME Provider  Why: contact for any issues with rolling walker  Contact information:  [address]  [facility]  [facility] [id number] [phone]                                  "

## (undated) DEVICE — KIT EVACUATOR 3-SPRING 1/8 DRN

## (undated) DEVICE — GLOVE BIOGEL SKINSENSE PI 7.0

## (undated) DEVICE — TIP YANKAUERS BULB NO VENT

## (undated) DEVICE — BLADE SAG DUAL 18MMX1.27MMX90M

## (undated) DEVICE — SHEET HIP ABSORB CIRC ELAS 8

## (undated) DEVICE — SUT VICRYL PLUS 2-0 CT1 18

## (undated) DEVICE — DRAPE INCISE IOBAN 2 23X17IN

## (undated) DEVICE — DRAPE CAMERA/VIDEO 5 X 96

## (undated) DEVICE — TRAY CATH FOL SIL URIMTR 16FR

## (undated) DEVICE — BANDAGE CURAD ADH FABRIC 1X3IN

## (undated) DEVICE — COVER HD BACK TABLE 6FT

## (undated) DEVICE — SPONGE GAUZE 16PLY 4X4

## (undated) DEVICE — ELECTRODE REM PLYHSV RETURN 9

## (undated) DEVICE — GOWN POLY REINF BRTH SLV XL

## (undated) DEVICE — NDL 21GA X1 1/2 REG BEVEL

## (undated) DEVICE — UNDERGLOVES BIOGEL PI SIZE 8

## (undated) DEVICE — STAPLER SKIN PROXIMATE WIDE

## (undated) DEVICE — COVER MAYO STAND REINFRCD 30

## (undated) DEVICE — SOL IRR SOD CHL .9% POUR

## (undated) DEVICE — GLOVE BIOGEL SKINSENSE PI 8.5

## (undated) DEVICE — UNDERGLOVES BIOGEL PI SIZE 8.5

## (undated) DEVICE — UNDERGLOVES BIOGEL PI SZ 7 LF

## (undated) DEVICE — PULSAVAC ZIMMER

## (undated) DEVICE — SUT VICRYL+ 1 CTX 18IN VIOL

## (undated) DEVICE — SUT STRATAFIX PDS 1 CTX 18IN

## (undated) DEVICE — DRAPE STERI INSTRUMENT 1018

## (undated) DEVICE — SUT ETHIBOND EXCEL 5-0 V-40

## (undated) DEVICE — BLANKET HYPER ADULT 24X60IN

## (undated) DEVICE — POSITIONER IV ARMBOARD FOAM

## (undated) DEVICE — GLOVE BIOGEL SKINSENSE PI 7.5

## (undated) DEVICE — APPLICATOR CHLORAPREP ORN 26ML

## (undated) DEVICE — SPONGE LAP 18X18 PREWASHED

## (undated) DEVICE — Device

## (undated) DEVICE — SOL NACL IRR 3000ML

## (undated) DEVICE — CONTAINER SPEC OR STRL 4.5OZ

## (undated) DEVICE — DRAPE STERI U-SHAPED 47X51IN

## (undated) DEVICE — MASK FLYTE HOOD PEEL AWAY

## (undated) DEVICE — DRESSING AQUACEL AG 3.5X10IN

## (undated) DEVICE — PAD UNDERPAD 30X30

## (undated) DEVICE — ALCOHOL ISOPROPYL BLU 70% 16OZ

## (undated) DEVICE — DRAPE THREE-QTR REINF 53X77IN